# Patient Record
Sex: FEMALE | Race: BLACK OR AFRICAN AMERICAN | NOT HISPANIC OR LATINO | Employment: PART TIME | URBAN - METROPOLITAN AREA
[De-identification: names, ages, dates, MRNs, and addresses within clinical notes are randomized per-mention and may not be internally consistent; named-entity substitution may affect disease eponyms.]

---

## 2021-10-15 ENCOUNTER — TRANSITIONAL CARE UNIT VISIT (OUTPATIENT)
Dept: GERIATRICS | Facility: CLINIC | Age: 66
End: 2021-10-15
Payer: COMMERCIAL

## 2021-10-15 VITALS
DIASTOLIC BLOOD PRESSURE: 76 MMHG | WEIGHT: 225 LBS | HEIGHT: 66 IN | RESPIRATION RATE: 18 BRPM | TEMPERATURE: 97.9 F | HEART RATE: 67 BPM | SYSTOLIC BLOOD PRESSURE: 121 MMHG | OXYGEN SATURATION: 96 % | BODY MASS INDEX: 36.16 KG/M2

## 2021-10-15 DIAGNOSIS — Z98.1 S/P SPINAL FUSION: ICD-10-CM

## 2021-10-15 DIAGNOSIS — G95.29 SPINAL CORD COMPRESSION DUE TO MALIGNANT NEOPLASM METASTATIC TO SPINE (H): Primary | ICD-10-CM

## 2021-10-15 DIAGNOSIS — M62.830 BACK MUSCLE SPASM: ICD-10-CM

## 2021-10-15 DIAGNOSIS — I50.22 CHRONIC SYSTOLIC HEART FAILURE (H): ICD-10-CM

## 2021-10-15 DIAGNOSIS — I10 HYPERTENSION, UNSPECIFIED TYPE: ICD-10-CM

## 2021-10-15 DIAGNOSIS — C79.51 SPINAL CORD COMPRESSION DUE TO MALIGNANT NEOPLASM METASTATIC TO SPINE (H): Primary | ICD-10-CM

## 2021-10-15 DIAGNOSIS — Z90.13 H/O MASTECTOMY, BILATERAL: ICD-10-CM

## 2021-10-15 PROBLEM — Z86.0100 HISTORY OF COLON POLYPS: Status: ACTIVE | Noted: 2021-10-15

## 2021-10-15 PROBLEM — R68.89 LOW BODY TEMPERATURE: Status: ACTIVE | Noted: 2021-10-08

## 2021-10-15 PROBLEM — S22.070D CLOSED WEDGE COMPRESSION FRACTURE OF T9 VERTEBRA WITH ROUTINE HEALING: Status: ACTIVE | Noted: 2021-09-21

## 2021-10-15 PROBLEM — M48.54XG: Status: ACTIVE | Noted: 2021-10-08

## 2021-10-15 PROBLEM — G47.9 SLEEP DISORDER: Status: ACTIVE | Noted: 2021-10-15

## 2021-10-15 PROBLEM — I31.39 PERICARDIAL EFFUSION: Status: ACTIVE | Noted: 2020-06-02

## 2021-10-15 PROBLEM — I25.5 ISCHEMIC CARDIOMYOPATHY: Status: ACTIVE | Noted: 2017-02-20

## 2021-10-15 PROBLEM — I51.3 LEFT ATRIAL THROMBUS: Status: ACTIVE | Noted: 2020-06-02

## 2021-10-15 PROCEDURE — 99309 SBSQ NF CARE MODERATE MDM 30: CPT | Performed by: NURSE PRACTITIONER

## 2021-10-15 RX ORDER — LETROZOLE 2.5 MG/1
2.5 TABLET, FILM COATED ORAL DAILY
COMMUNITY
Start: 2021-10-15

## 2021-10-15 RX ORDER — METHOCARBAMOL 750 MG/1
750 TABLET, FILM COATED ORAL 3 TIMES DAILY
COMMUNITY
Start: 2021-10-15 | End: 2021-11-03

## 2021-10-15 RX ORDER — LIDOCAINE/PRILOCAINE 2.5 %-2.5%
CREAM (GRAM) TOPICAL PRN
COMMUNITY
Start: 2021-10-15

## 2021-10-15 RX ORDER — AMOXICILLIN 250 MG
2 CAPSULE ORAL DAILY
COMMUNITY
Start: 2021-10-15 | End: 2021-10-26

## 2021-10-15 RX ORDER — EZETIMIBE 10 MG/1
10 TABLET ORAL DAILY
COMMUNITY
Start: 2021-10-15

## 2021-10-15 RX ORDER — ACETAMINOPHEN 500 MG
1000 TABLET ORAL EVERY 6 HOURS
COMMUNITY
Start: 2021-10-15 | End: 2021-10-29

## 2021-10-15 RX ORDER — BACLOFEN 10 MG/1
10 TABLET ORAL
COMMUNITY
Start: 2021-10-15 | End: 2021-11-03

## 2021-10-15 RX ORDER — OMEGA-3 FATTY ACIDS/FISH OIL 300-1000MG
1 CAPSULE ORAL DAILY
COMMUNITY
Start: 2021-10-15 | End: 2021-10-29

## 2021-10-15 RX ORDER — DEXAMETHASONE 2 MG/1
2 TABLET ORAL
COMMUNITY
Start: 2021-10-14 | End: 2021-10-16

## 2021-10-15 RX ORDER — ASPIRIN 81 MG/1
81 TABLET, CHEWABLE ORAL
COMMUNITY
Start: 2021-10-15 | End: 2021-11-03

## 2021-10-15 RX ORDER — NITROGLYCERIN 0.4 MG/1
0.4 TABLET SUBLINGUAL EVERY 5 MIN PRN
COMMUNITY
Start: 2021-10-15

## 2021-10-15 RX ORDER — FUROSEMIDE 20 MG
20 TABLET ORAL DAILY
COMMUNITY
Start: 2021-10-15

## 2021-10-15 RX ORDER — SCOLOPAMINE TRANSDERMAL SYSTEM 1 MG/1
1 PATCH, EXTENDED RELEASE TRANSDERMAL
COMMUNITY
Start: 2021-10-15

## 2021-10-15 RX ORDER — OXYCODONE HYDROCHLORIDE 5 MG/1
5 TABLET ORAL EVERY 6 HOURS PRN
COMMUNITY
Start: 2021-10-15 | End: 2021-10-26

## 2021-10-15 RX ORDER — ROSUVASTATIN CALCIUM 40 MG/1
40 TABLET, COATED ORAL AT BEDTIME
COMMUNITY
Start: 2021-10-15

## 2021-10-15 RX ORDER — CARVEDILOL 6.25 MG/1
6.25 TABLET ORAL 2 TIMES DAILY WITH MEALS
COMMUNITY
Start: 2021-10-15

## 2021-10-15 ASSESSMENT — MIFFLIN-ST. JEOR: SCORE: 1577.34

## 2021-10-15 NOTE — LETTER
10/15/2021        RE: Calli Holt  [address] [facility] [address]        [facility]  Primary Care Provider & Clinic: Rocío Montemayor MD, MD, [address] [place] [address] / [facility] [address]; Phone: [phone]; Fax: [phone]  Chief Complaint   Patient presents with     Hospital F/U     [facility] 10/08/2021 - 10/14/2021     [place] Medical Record Number: [id number]  Place of Service Where Encounter Took Place: [facility] (TCU) [[id number]]    Calli Holt is a 66 year old (1955), admitted to the above facility from  [facility] . Hospital stay 10/8/21 through 10/14/21.    HPI:    DIAGNOSES:  1. T9 pathologic compression fracture.  2. Metastatic breast cancer to the spine with epidural spinal cord compression.    PROCEDURES:  1. Open reduction of fracture at T9.  2. Posterior segmental instrumentation from T7-T11 with [employer] Solera system.  3. Bilateral T9 costotransversectomy for extracavitary approach to the spine.  4. T8-10 laminectomy and total bilateral facetectomies for decompression of spinal cord.  5. T9 corpectomy with removal of tumor.  6. Insertion of intervertebral mechanical device from T8-T10 with Synthes XRL expandable cage.  7. Anterior interbody arthrodesis from T8-T10 with morcellized allograft.  8. Posterior arthrodesis from T7-11 with morcellized allograft.    The patient is a long-term survivor of breast cancer [id number], back pain x5 weeks, found T9 metastatic lesion with compression Fx, no other lesions and multimodal treatment was discussed with radiation oncology, plan to separate lesion, maximal cytoreduction, and stabilize spine, post op tumor was as expected.    Patient today pleasant with mild post-anesthesia mental difficulties, back incision approximated with no s/s infection, has port-a-cath R upper chest that is used for all blood access due to peripheral vascular access limitations, has  TLSO on, upright in WC, denies pain, + back muscle spasms this morning, of note has had previous double mastectomy with chemo Tx in 2012/2016, overall no distress, able to make needs known.      CODE STATUS/ADVANCE DIRECTIVES DISCUSSION: No Order - CPR/Full code   Patient's living condition: lives alone  ALLERGIES:   Allergies   Allergen Reactions     Amoxicillin Nausea, Cramps and GI Disturbance     Ampicillin Nausea, Cramps and GI Disturbance     Erythromycin Unknown     Oxycodone Other (See Comments)     Psychosis/psychotic issues - has tolerates percocet in the past without issues (12/2011)    PAST MEDICAL HISTORY: No past medical history on file.   PAST SURGICAL HISTORY:  has no past surgical history on file.  FAMILY HISTORY: family history is not on file.  SOCIAL HISTORY:  reports that she has never smoked. She does not have any smokeless tobacco history on file.    Post Discharge Medication Reconciliation Status: discharge medications reconciled and changed, per note/orders  Current Outpatient Medications   Medication Sig     acetaminophen (TYLENOL) 500 MG tablet Take 1,000 mg by mouth every 6 hours as needed for pain     aspirin (ASA) 81 MG chewable tablet Take 81 mg by mouth daily with food     baclofen (LIORESAL) 10 MG tablet Take 10 mg by mouth 3 times daily     carvedilol (COREG) 6.25 MG tablet Take 6.25 mg by mouth 2 times daily (with meals)     dexamethasone (DECADRON) 2 MG tablet Take 2 mg by mouth daily with food Ending on 10/15/21 at 23:59     ezetimibe (ZETIA) 10 MG tablet Take 10 mg by mouth daily     furosemide (LASIX) 20 MG tablet Take 20 mg by mouth daily     letrozole (FEMARA) 2.5 MG tablet Take 2.5 mg by mouth daily     lidocaine-prilocaine (EMLA) 2.5-2.5 % external cream Apply topically as needed for moderate pain Apply to port area 45 minutes prior to access     methocarbamol (ROBAXIN) 750 MG tablet Take 750 mg by mouth 3 times daily     nitroGLYcerin (NITROSTAT) 0.4 MG sublingual tablet  "Place 0.4 mg under the tongue every 5 minutes as needed for chest pain For chest pain place 1 tablet under the tongue every 5 minutes for 3 doses. If symptoms persist 5 minutes after 1st dose call 911.     omega 3 1000 MG CAPS Take 1 capsule by mouth daily     oxyCODONE (ROXICODONE) 5 MG tablet Take 5 mg by mouth every 6 hours as needed for pain     rosuvastatin (CRESTOR) 40 MG tablet Take 40 mg by mouth At Bedtime     scopolamine (TRANSDERM) 1 MG/3DAYS 72 hr patch Place 1 patch onto the skin every 72 hours     senna-docusate (SENOKOT-S/PERICOLACE) 8.6-50 MG tablet Take 2 tablets by mouth daily     No current facility-administered medications for this visit.     ROS:  4 point ROS including Respiratory, CV, GI and , other than that noted in the HPI,  is negative    Vitals:  /76   Pulse 67   Temp 97.9  F (36.6  C)   Resp 18   Ht 1.676 m (5' 6\")   Wt 102.1 kg (225 lb)   SpO2 96%   BMI 36.32 kg/m    Exam:  GENERAL APPEARANCE:  in no distress, appears healthy  ENT:  Mouth and posterior oropharynx normal, moist mucous membranes  RESP:  lungs clear to auscultation   CV:  peripheral edema mild+ in lower legs, irregular rhythm rate  ABDOMEN:  bowel sounds normal  M/S:   Gait and station abnormal transfer assist  SKIN:  Inspection of skin and subcutaneous tissue baseline  NEURO:   Examination of sensation by touch normal  PSYCH:  oriented X 3    Lab/Diagnostic data:  Recent labs in [facility] reviewed by me today.     ASSESSMENT/PLAN:  (G95.29,  C79.51) Spinal cord compression due to malignant neoplasm metastatic to spine (H)  (primary encounter diagnosis)  (Z98.1) S/P spinal fusion  (M62.830) Back muscle spasm  Comment: tolerated procedure well, no distress, spasms+  Plan:   -PT/OT eval and treat  -discontinue oxycodone (allergy) and senokot (not effective)  -start T#3 1/2 tab Q4h PRN for pain  -continue APAP, baclofen, robaxin and scopolamine scheduled  -per request; oral glycerin BID PRN for " constipation  -portAcath last flush 10/14; flush Q4w then heparinize  -incentive spirometry QID  -obtain wrist BP cuff  -restart ASA 10/16, no restart date yet for xarelto  -follow up  in 2-4 weeks  -plans to start radiation, has appt for mapping in Nov      (I50.22) Chronic systolic heart failure (H)  Comment: IRR, denies CP  Plan: continue ASA81, lasix 20mg, statin  -follow up cardiology PRN    (I10) Hypertension, unspecified type  Comment: recent SBP's 120 range  Plan: continue coreg 6.25mg BID (was previously on 12.5)  -of note, lisinopril was DC'd in hospital  -staff to check VS as scheduled    (Z90.13) H/O mastectomy, bilateral  Comment: plus chemotherapy  Plan: continue letrozole 2.5mg daily  -follow up oncology PRN        Electronically signed by: ARNULFO Blake CNP        Sincerely,        ARNULFO Blake CNP

## 2021-10-15 NOTE — PROGRESS NOTES
[facility] GERIATRIC SERVICES  Primary Care Provider & Clinic: Rocío Montemayor MD, MD, [address] [facility] [address]; Phone: [phone]; Fax: [phone]  Chief Complaint   Patient presents with     Hospital F/U     [facility] 10/08/2021 - 10/14/2021     [place] Medical Record Number: [id number]  Place of Service Where Encounter Took Place: [facility] (TCU) [[id number]]    Calli Holt is a 66 year old (1955), admitted to the above facility from  [facility] . Hospital stay 10/8/21 through 10/14/21.    HPI:    DIAGNOSES:  1. T9 pathologic compression fracture.  2. Metastatic breast cancer to the spine with epidural spinal cord compression.    PROCEDURES:  1. Open reduction of fracture at T9.  2. Posterior segmental instrumentation from T7-T11 with [employer] Solera system.  3. Bilateral T9 costotransversectomy for extracavitary approach to the spine.  4. T8-10 laminectomy and total bilateral facetectomies for decompression of spinal cord.  5. T9 corpectomy with removal of tumor.  6. Insertion of intervertebral mechanical device from T8-T10 with Synthes XRL expandable cage.  7. Anterior interbody arthrodesis from T8-T10 with morcellized allograft.  8. Posterior arthrodesis from T7-11 with morcellized allograft.    The patient is a long-term survivor of breast cancer [id number], back pain x5 weeks, found T9 metastatic lesion with compression Fx, no other lesions and multimodal treatment was discussed with radiation oncology, plan to separate lesion, maximal cytoreduction, and stabilize spine, post op tumor was as expected.    Patient today pleasant with mild post-anesthesia mental difficulties, back incision approximated with no s/s infection, has port-a-cath R upper chest that is used for all blood access due to peripheral vascular access limitations, has TLSO on, upright in WC, denies pain, + back muscle spasms this morning, of note has had previous double  mastectomy with chemo Tx in 2012/2016, overall no distress, able to make needs known.      CODE STATUS/ADVANCE DIRECTIVES DISCUSSION: No Order - CPR/Full code   Patient's living condition: lives alone  ALLERGIES:   Allergies   Allergen Reactions     Amoxicillin Nausea, Cramps and GI Disturbance     Ampicillin Nausea, Cramps and GI Disturbance     Erythromycin Unknown     Oxycodone Other (See Comments)     Psychosis/psychotic issues - has tolerates percocet in the past without issues (12/2011)    PAST MEDICAL HISTORY: No past medical history on file.   PAST SURGICAL HISTORY:  has no past surgical history on file.  FAMILY HISTORY: family history is not on file.  SOCIAL HISTORY:  reports that she has never smoked. She does not have any smokeless tobacco history on file.    Post Discharge Medication Reconciliation Status: discharge medications reconciled and changed, per note/orders  Current Outpatient Medications   Medication Sig     acetaminophen (TYLENOL) 500 MG tablet Take 1,000 mg by mouth every 6 hours as needed for pain     aspirin (ASA) 81 MG chewable tablet Take 81 mg by mouth daily with food     baclofen (LIORESAL) 10 MG tablet Take 10 mg by mouth 3 times daily     carvedilol (COREG) 6.25 MG tablet Take 6.25 mg by mouth 2 times daily (with meals)     dexamethasone (DECADRON) 2 MG tablet Take 2 mg by mouth daily with food Ending on 10/15/21 at 23:59     ezetimibe (ZETIA) 10 MG tablet Take 10 mg by mouth daily     furosemide (LASIX) 20 MG tablet Take 20 mg by mouth daily     letrozole (FEMARA) 2.5 MG tablet Take 2.5 mg by mouth daily     lidocaine-prilocaine (EMLA) 2.5-2.5 % external cream Apply topically as needed for moderate pain Apply to port area 45 minutes prior to access     methocarbamol (ROBAXIN) 750 MG tablet Take 750 mg by mouth 3 times daily     nitroGLYcerin (NITROSTAT) 0.4 MG sublingual tablet Place 0.4 mg under the tongue every 5 minutes as needed for chest pain For chest pain place 1 tablet under  "the tongue every 5 minutes for 3 doses. If symptoms persist 5 minutes after 1st dose call 911.     omega 3 1000 MG CAPS Take 1 capsule by mouth daily     oxyCODONE (ROXICODONE) 5 MG tablet Take 5 mg by mouth every 6 hours as needed for pain     rosuvastatin (CRESTOR) 40 MG tablet Take 40 mg by mouth At Bedtime     scopolamine (TRANSDERM) 1 MG/3DAYS 72 hr patch Place 1 patch onto the skin every 72 hours     senna-docusate (SENOKOT-S/PERICOLACE) 8.6-50 MG tablet Take 2 tablets by mouth daily     No current facility-administered medications for this visit.     ROS:  4 point ROS including Respiratory, CV, GI and , other than that noted in the HPI,  is negative    Vitals:  /76   Pulse 67   Temp 97.9  F (36.6  C)   Resp 18   Ht 1.676 m (5' 6\")   Wt 102.1 kg (225 lb)   SpO2 96%   BMI 36.32 kg/m    Exam:  GENERAL APPEARANCE:  in no distress, appears healthy  ENT:  Mouth and posterior oropharynx normal, moist mucous membranes  RESP:  lungs clear to auscultation   CV:  peripheral edema mild+ in lower legs, irregular rhythm rate  ABDOMEN:  bowel sounds normal  M/S:   Gait and station abnormal transfer assist  SKIN:  Inspection of skin and subcutaneous tissue baseline  NEURO:   Examination of sensation by touch normal  PSYCH:  oriented X 3    Lab/Diagnostic data:  Recent labs in [facility] reviewed by me today.     ASSESSMENT/PLAN:  (G95.29,  C79.51) Spinal cord compression due to malignant neoplasm metastatic to spine (H)  (primary encounter diagnosis)  (Z98.1) S/P spinal fusion  (M62.830) Back muscle spasm  Comment: tolerated procedure well, no distress, spasms+  Plan:   -PT/OT eval and treat  -discontinue oxycodone (allergy) and senokot (not effective)  -start T#3 1/2 tab Q4h PRN for pain  -continue APAP, baclofen, robaxin and scopolamine scheduled  -per request; oral glycerin BID PRN for constipation  -portAcath last flush 10/14; flush Q4w then heparinize  -incentive spirometry QID  -obtain wrist BP cuff  -restart " ASA 10/16, no restart date yet for xarelto  -follow up  in 2-4 weeks  -plans to start radiation, has appt for mapping in Nov      (I50.22) Chronic systolic heart failure (H)  Comment: IRR, denies CP  Plan: continue ASA81, lasix 20mg, statin  -follow up cardiology PRN    (I10) Hypertension, unspecified type  Comment: recent SBP's 120 range  Plan: continue coreg 6.25mg BID (was previously on 12.5)  -of note, lisinopril was DC'd in hospital  -staff to check VS as scheduled    (Z90.13) H/O mastectomy, bilateral  Comment: plus chemotherapy  Plan: continue letrozole 2.5mg daily  -follow up oncology PRN        Electronically signed by: ARNULFO Blake CNP

## 2021-10-18 ENCOUNTER — TRANSITIONAL CARE UNIT VISIT (OUTPATIENT)
Dept: GERIATRICS | Facility: CLINIC | Age: 66
End: 2021-10-18
Payer: COMMERCIAL

## 2021-10-18 VITALS
OXYGEN SATURATION: 99 % | BODY MASS INDEX: 35.52 KG/M2 | HEART RATE: 90 BPM | DIASTOLIC BLOOD PRESSURE: 75 MMHG | RESPIRATION RATE: 18 BRPM | HEIGHT: 66 IN | SYSTOLIC BLOOD PRESSURE: 112 MMHG | WEIGHT: 221 LBS | TEMPERATURE: 98 F

## 2021-10-18 DIAGNOSIS — M62.830 BACK MUSCLE SPASM: ICD-10-CM

## 2021-10-18 DIAGNOSIS — C79.51 SPINAL CORD COMPRESSION DUE TO MALIGNANT NEOPLASM METASTATIC TO SPINE (H): Primary | ICD-10-CM

## 2021-10-18 DIAGNOSIS — R53.83 LETHARGY: ICD-10-CM

## 2021-10-18 DIAGNOSIS — G95.29 SPINAL CORD COMPRESSION DUE TO MALIGNANT NEOPLASM METASTATIC TO SPINE (H): Primary | ICD-10-CM

## 2021-10-18 PROCEDURE — 99309 SBSQ NF CARE MODERATE MDM 30: CPT | Performed by: NURSE PRACTITIONER

## 2021-10-18 ASSESSMENT — MIFFLIN-ST. JEOR: SCORE: 1559.2

## 2021-10-18 NOTE — PROGRESS NOTES
"[facility] SERVICES  [place] Medical Record Number: [id number]  Chief Complaint   Patient presents with     RECHECK     HPI: Calli Holt is a 66 year old (1955), who is being seen today for an episodic care visit at: [facility] ([facility]) [[id number]]. Today's concern is:   1. Spinal cord compression due to malignant neoplasm metastatic to spine (H)    2. Back muscle spasm    3. Lethargy      Patient seen today for follow up, pleasant, denies pain, back spasms intermittent and strong, dislikes feeling lethargic, may be r/t muscle relaxants (2), VS WNL, back incision approximated with no discharge, no distress.    Allergies and PMH/PSH reviewed in [facility] today.    REVIEW OF SYSTEMS:  4 point ROS including Respiratory, CV, GI and , other than that noted in the HPI,  is negative    Objective:   /75   Pulse 90   Temp 98  F (36.7  C)   Resp 18   Ht 1.676 m (5' 6\")   Wt 100.2 kg (221 lb)   SpO2 99%   BMI 35.67 kg/m    Exam:  GENERAL APPEARANCE:  Alert, in no distress  ENT:  Mouth and posterior oropharynx normal, moist mucous membranes  RESP:  lungs clear to auscultation   CV:  peripheral edema soft/mild+ in lower legs  ABDOMEN:  bowel sounds normal  M/S:   Gait and station abnormal transfer assist  SKIN:  Inspection of skin and subcutaneous tissue baseline  NEURO:   Examination of sensation by touch normal  PSYCH:  oriented X 3    Labs:   Labs done in SNF are in [facility]. Please refer to them using [facility]/Care Everywhere.    Assessment/Plan:  (G95.29,  C79.51) Spinal cord compression due to malignant neoplasm metastatic to spine (H)  (primary encounter diagnosis)  (M62.830) Back muscle spasm  (R53.83) Lethargy  Comment: balancing spasms with lethargy, mild cognitive limitations  Plan:   -change baclofen to 10mg BID PRN  -continue methocarbamol  -change APAP to TID scheduled  -encouraged to use T3# tabs for pain  -if spasms are not worsening may reduce methocarbamol  -plan " to follow up later this week RE: status and plan        Electronically signed by: ARNULFO Blake CNP

## 2021-10-18 NOTE — LETTER
"    10/18/2021        RE: Calli Holt  [address] [facility] [address]        [facility]IATRIC [facility]  [place] Medical Record Number: [id number]  Chief Complaint   Patient presents with     RECHECK     HPI: Calli Holt is a 66 year old (1955), who is being seen today for an episodic care visit at: [facility] ([facility]) [[id number]]. Today's concern is:   1. Spinal cord compression due to malignant neoplasm metastatic to spine (H)    2. Back muscle spasm    3. Lethargy      Patient seen today for follow up, pleasant, denies pain, back spasms intermittent and strong, dislikes feeling lethargic, may be r/t muscle relaxants (2), VS WNL, back incision approximated with no discharge, no distress.    Allergies and PMH/PSH reviewed in [facility] today.    REVIEW OF SYSTEMS:  4 point ROS including Respiratory, CV, GI and , other than that noted in the HPI,  is negative    Objective:   /75   Pulse 90   Temp 98  F (36.7  C)   Resp 18   Ht 1.676 m (5' 6\")   Wt 100.2 kg (221 lb)   SpO2 99%   BMI 35.67 kg/m    Exam:  GENERAL APPEARANCE:  Alert, in no distress  ENT:  Mouth and posterior oropharynx normal, moist mucous membranes  RESP:  lungs clear to auscultation   CV:  peripheral edema soft/mild+ in lower legs  ABDOMEN:  bowel sounds normal  M/S:   Gait and station abnormal transfer assist  SKIN:  Inspection of skin and subcutaneous tissue baseline  NEURO:   Examination of sensation by touch normal  PSYCH:  oriented X 3    Labs:   Labs done in SNF are in [place] [facility]. Please refer to them using [facility]/Care Everywhere.    Assessment/Plan:  (G95.29,  C79.51) Spinal cord compression due to malignant neoplasm metastatic to spine (H)  (primary encounter diagnosis)  (M62.830) Back muscle spasm  (R53.83) Lethargy  Comment: balancing spasms with lethargy, mild cognitive limitations  Plan:   -change baclofen to 10mg BID PRN  -continue methocarbamol  -change APAP to TID " scheduled  -encouraged to use T3# tabs for pain  -if spasms are not worsening may reduce methocarbamol  -plan to follow up later this week RE: status and plan        Electronically signed by: ARNULFO Blake CNP        Sincerely,        ARNULFO Blake CNP

## 2021-10-26 ENCOUNTER — TRANSITIONAL CARE UNIT VISIT (OUTPATIENT)
Dept: GERIATRICS | Facility: CLINIC | Age: 66
End: 2021-10-26
Payer: COMMERCIAL

## 2021-10-26 VITALS
BODY MASS INDEX: 34.67 KG/M2 | HEIGHT: 66 IN | SYSTOLIC BLOOD PRESSURE: 114 MMHG | TEMPERATURE: 97.5 F | DIASTOLIC BLOOD PRESSURE: 69 MMHG | WEIGHT: 215.7 LBS | RESPIRATION RATE: 18 BRPM | HEART RATE: 77 BPM | OXYGEN SATURATION: 97 %

## 2021-10-26 DIAGNOSIS — Z86.73 RECENT CEREBROVASCULAR ACCIDENT (CVA): Primary | ICD-10-CM

## 2021-10-26 DIAGNOSIS — K59.00 CONSTIPATION, UNSPECIFIED CONSTIPATION TYPE: ICD-10-CM

## 2021-10-26 DIAGNOSIS — G95.29 SPINAL CORD COMPRESSION DUE TO MALIGNANT NEOPLASM METASTATIC TO SPINE (H): ICD-10-CM

## 2021-10-26 DIAGNOSIS — Z98.1 S/P SPINAL FUSION: ICD-10-CM

## 2021-10-26 DIAGNOSIS — M48.54XG: ICD-10-CM

## 2021-10-26 DIAGNOSIS — C79.51 SPINAL CORD COMPRESSION DUE TO MALIGNANT NEOPLASM METASTATIC TO SPINE (H): ICD-10-CM

## 2021-10-26 DIAGNOSIS — I25.5 ISCHEMIC CARDIOMYOPATHY: ICD-10-CM

## 2021-10-26 DIAGNOSIS — R53.81 PHYSICAL DECONDITIONING: ICD-10-CM

## 2021-10-26 DIAGNOSIS — I10 HYPERTENSION, UNSPECIFIED TYPE: ICD-10-CM

## 2021-10-26 DIAGNOSIS — I51.3 LEFT ATRIAL THROMBUS: ICD-10-CM

## 2021-10-26 DIAGNOSIS — E78.5 DYSLIPIDEMIA: ICD-10-CM

## 2021-10-26 PROBLEM — I63.9 ACUTE ISCHEMIC STROKE (H): Status: ACTIVE | Noted: 2021-10-22

## 2021-10-26 PROBLEM — R29.810 FACIAL DROOP: Status: ACTIVE | Noted: 2021-10-22

## 2021-10-26 PROBLEM — I63.9: Status: ACTIVE | Noted: 2021-10-26

## 2021-10-26 PROBLEM — R47.1 DYSARTHRIA: Status: ACTIVE | Noted: 2021-10-22

## 2021-10-26 PROCEDURE — 99310 SBSQ NF CARE HIGH MDM 45: CPT | Performed by: NURSE PRACTITIONER

## 2021-10-26 RX ORDER — MINERAL OIL 100 G/100G
1 OIL RECTAL DAILY PRN
COMMUNITY

## 2021-10-26 ASSESSMENT — MIFFLIN-ST. JEOR: SCORE: 1535.16

## 2021-10-26 NOTE — LETTER
10/26/2021        RE: Calli Holt  [address] [facility] [id number]        [facility]  PRIMARY CARE PROVIDER AND CLINIC:  Rocío Montemayor MD, MD, [address] [facility] / [facility] [address]  Chief Complaint   Patient presents with     Hospital F/U      [place] Medical Record Number:  [id number]  Place of Service where encounter took place:  [facility] (TCU) [[id number]]    Calli Holt  is a 66 year old  (1955), admitted to the above facility from  [facility] . Hospital stay 10/21/21 through 10/23/21.     HPI:    This is a 66-year-old female, with a past medical history significant for breast cancer s/p left mastectomy with metastasis to the thoracic spine, T9 pathologic fracture, left atrial thrombus, coronary artery disease, hyperlipidemia and ischemic cardiomyopathy, who was admitted to [facility] from 10/8/21 through 10/14/21 for T9 corpectomy with bilateral costotransversectomies, T7-11 posterolateral instrumented fusion on 10/8/21 by Dr. Ventura. PTA Rivaroxaban was held due procedure. Discharged to [facility] for physical rehabilitation.     Re-admitted to [facility] 10/21/21 through 10/23/21 after developing facial droop with expressive aphasia. Found to have cardioembolic infarcts with multifocal areas of small infarction in both hemispheres on MRI. Started on Aspirin and statin therapy. Neurosurgery was consulted. Started back on Rivaroxaban. Oncology recommended to continue on aromatase inhibitor therapy with letrozole and holding palbociclib until at least 1 week following her stroke. Discharged back to a TCU when medically stable.    Today, patient describes events leading up to hospitalization. Was not able to say what she was trying to say. Called 911 and then went to the hospital. Was able to talk once she got to the ED. Reports no longer having any residual symptoms from  the stroke although her voice is still hoarse. Biggest concern is her constipation. Has been an ongoing issue. States Senna and Miralax do not work. Takes 3 Glycerin capsules in the morning at home and that works perfectly. Denies pain.     CODE STATUS/ADVANCE DIRECTIVES DISCUSSION:  CPR/Full code   ALLERGIES:   Allergies   Allergen Reactions     Amoxicillin Nausea, Cramps and GI Disturbance     Ampicillin Nausea, Cramps and GI Disturbance     Erythromycin Unknown     Oxycodone Other (See Comments)     Psychosis/psychotic issues - has tolerates percocet in the past without issues (12/2011)      PAST MEDICAL HISTORY: No past medical history on file.   PAST SURGICAL HISTORY:   has no past surgical history on file.  FAMILY HISTORY: family history is not on file.  SOCIAL HISTORY:   reports that she has never smoked. She does not have any smokeless tobacco history on file.  Patient's living condition: lives alone    Post Discharge Medication Reconciliation Status: discharge medications reconciled, continue medications without change  Current Outpatient Medications   Medication Sig     acetaminophen-codeine (TYLENOL #3) 300-30 MG tablet Take 0.5 tablets by mouth every 4 hours as needed for severe pain     aspirin (ASA) 81 MG chewable tablet Take 81 mg by mouth daily with food     baclofen (LIORESAL) 10 MG tablet Take 10 mg by mouth 3 times daily      carvedilol (COREG) 6.25 MG tablet Take 6.25 mg by mouth 2 times daily (with meals)     ezetimibe (ZETIA) 10 MG tablet Take 10 mg by mouth daily     furosemide (LASIX) 20 MG tablet Take 20 mg by mouth daily     glycerin liquid daily And every day PRN     letrozole (FEMARA) 2.5 MG tablet Take 2.5 mg by mouth daily     lidocaine-prilocaine (EMLA) 2.5-2.5 % external cream Apply topically as needed for moderate pain Apply to port area 45 minutes prior to access     methocarbamol (ROBAXIN) 750 MG tablet Take 750 mg by mouth 3 times daily     mineral oil enema Place 1 enema rectally  "daily as needed for constipation     nitroGLYcerin (NITROSTAT) 0.4 MG sublingual tablet Place 0.4 mg under the tongue every 5 minutes as needed for chest pain For chest pain place 1 tablet under the tongue every 5 minutes for 3 doses. If symptoms persist 5 minutes after 1st dose call 911.     rivaroxaban ANTICOAGULANT (XARELTO) 20 MG TABS tablet Take 20 mg by mouth daily (with dinner)     rosuvastatin (CRESTOR) 40 MG tablet Take 40 mg by mouth At Bedtime     scopolamine (TRANSDERM) 1 MG/3DAYS 72 hr patch Place 1 patch onto the skin every 72 hours     No current facility-administered medications for this visit.     ROS:  4 point ROS including Respiratory, CV, GI and , other than that noted in the HPI,  is negative    Vitals:  /69   Pulse 77   Temp 97.5  F (36.4  C)   Resp 18   Ht 1.676 m (5' 6\")   Wt 97.8 kg (215 lb 11.2 oz)   SpO2 97%   BMI 34.81 kg/m    Exam:  GENERAL APPEARANCE:  Alert, in no distress  ENT:  Mouth and posterior oropharynx normal, moist mucous membranes  EYES:  EOM, conjunctivae, lids, pupils and irises normal  RESP:  respiratory effort and palpation of chest normal, lungs clear to auscultation , no respiratory distress  CV:  Palpation and auscultation of heart done , regular rate and rhythm, no murmur, rub, or gallop  ABDOMEN:  normal bowel sounds, soft, nontender, no hepatosplenomegaly or other masses  M/S:   No edema in BLE  SKIN:  Inspection of skin and subcutaneous tissue baseline, Palpation of skin and subcutaneous tissue baseline  NEURO:   Cranial nerves 2-12 are normal tested and grossly at patient's baseline  PSYCH:  affect and mood normal    Lab/Diagnostic data:  Labs done in SNF are in [facility]. Please refer to them using [facility]/Care Everywhere.    ASSESSMENT/PLAN:  Multifocal Bihemispheric Cerebral Infarctions. Likely cardioembolic with known ischemic cardiomyopathy and prior atrial thrombus. Presented with facial droop and expressive aphasia which has since resolved. " Rivaroxaban held for surgery below and re-initiated on 10/22/21. Also started on Aspirin. Takes Rosuvastatin.     Recent T9 Pathological Fracture S/P T9 Corpectomy with Bilateral Costotransversectomies and T7-11 Posterolateral Instrumented Fusion on 10/8/21 by Dr. Ventura. Follow-up with Dr. Ventura on 11/23/21 as scheduled. Repeat imaging to be performed at that time. Acetaminophen with Codeine and Methocarbamol ordered for pain. Also on Baclofen. Will discontinue scheduled Tylenol per patient request. TLSO brace to be worn when OOB or HOB > 45 degrees. Physical and Occupational Therapy ordered.    Metastatic Breast Cancer to the Spine. Follow-up with Oncology, Dr. Rubens Holbrook,as per them. Holding Ibrance for at least 1 week. Taking Letrozole. Also has a Scopolamine Patch.    Acute Blood Loss Anemia. Secondary to above. Hemoglobin 11.8 on 10/8/21 -> 9.5 on 10/22/21. Repeat CBC to ensure stability.     History of Left Atrial Thrombus on Chronic Anticoagulation. Rivaroxaban held for surgery and re-initiated as noted above.    Coronary Artery Disease with Ischemic Cardiomyopathy with EF 35-40% in June 2020, Hypertension and Hyperlipidemia. Followed by [facility]. Patient requests to discontinue Fish Oil. Monitor blood pressure daily. Daily weights. Taking Carvedilol, Ezetimbe, Furosemide and Rosuvastatin.    Constipation. Patient reports Senna-S and Miralax do not work. Reviewed Lactulose, but patient does not want to try. Will schedule Glycerin per patient request as well as Mineral Oil suppository.     Physical Deconditioning. Secondary to recent hospitalization and co-morbidities. Physical and Occupational Therapy ordered.    Orders:  Discontinue Tylenol   Discontinue Fish Oil  Change Glycerin from PRN to scheduled  Give Mineral Oil Enema x 1 now    Total time spent with patient visit at the skilled nursing facility was 35 min including patient visit and review of past records. Greater than 50% of  total time spent with counseling and coordinating care due to discussion regarding code status, review of TCU stay, review of medications, adjustments to medications as requested, review of regimen for constipation, orders written at facility and discussion with staff.     Electronically signed by:  ARNULFO Mohan CNP                      Sincerely,        ARNULFO Mohan CNP

## 2021-10-26 NOTE — PROGRESS NOTES
[facility] GERIATRIC SERVICES  PRIMARY CARE PROVIDER AND CLINIC:  Rocío Montemayor MD, MD, [address] [facility] [address]  Chief Complaint   Patient presents with     Hospital F/U      [place] Medical Record Number:  [id number]  Place of Service where encounter took place:  [facility] (TCU) [[id number]]    Calli Holt  is a 66 year old  (1955), admitted to the above facility from  [facility] . Hospital stay 10/21/21 through 10/23/21.     HPI:    This is a 66-year-old female, with a past medical history significant for breast cancer s/p left mastectomy with metastasis to the thoracic spine, T9 pathologic fracture, left atrial thrombus, coronary artery disease, hyperlipidemia and ischemic cardiomyopathy, who was admitted to [facility] from 10/8/21 through 10/14/21 for T9 corpectomy with bilateral costotransversectomies, T7-11 posterolateral instrumented fusion on 10/8/21 by Dr. Ventura. PTA Rivaroxaban was held due procedure. Discharged to [facility] for physical rehabilitation.     Re-admitted to [facility] 10/21/21 through 10/23/21 after developing facial droop with expressive aphasia. Found to have cardioembolic infarcts with multifocal areas of small infarction in both hemispheres on MRI. Started on Aspirin and statin therapy. Neurosurgery was consulted. Started back on Rivaroxaban. Oncology recommended to continue on aromatase inhibitor therapy with letrozole and holding palbociclib until at least 1 week following her stroke. Discharged back to a TCU when medically stable.    Today, patient describes events leading up to hospitalization. Was not able to say what she was trying to say. Called 911 and then went to the hospital. Was able to talk once she got to the ED. Reports no longer having any residual symptoms from the stroke although her voice is still hoarse. Biggest concern is her constipation. Has been an ongoing issue.  States Senna and Miralax do not work. Takes 3 Glycerin capsules in the morning at home and that works perfectly. Denies pain.     CODE STATUS/ADVANCE DIRECTIVES DISCUSSION:  CPR/Full code   ALLERGIES:   Allergies   Allergen Reactions     Amoxicillin Nausea, Cramps and GI Disturbance     Ampicillin Nausea, Cramps and GI Disturbance     Erythromycin Unknown     Oxycodone Other (See Comments)     Psychosis/psychotic issues - has tolerates percocet in the past without issues (12/2011)      PAST MEDICAL HISTORY: No past medical history on file.   PAST SURGICAL HISTORY:   has no past surgical history on file.  FAMILY HISTORY: family history is not on file.  SOCIAL HISTORY:   reports that she has never smoked. She does not have any smokeless tobacco history on file.  Patient's living condition: lives alone    Post Discharge Medication Reconciliation Status: discharge medications reconciled, continue medications without change  Current Outpatient Medications   Medication Sig     acetaminophen-codeine (TYLENOL #3) 300-30 MG tablet Take 0.5 tablets by mouth every 4 hours as needed for severe pain     aspirin (ASA) 81 MG chewable tablet Take 81 mg by mouth daily with food     baclofen (LIORESAL) 10 MG tablet Take 10 mg by mouth 3 times daily      carvedilol (COREG) 6.25 MG tablet Take 6.25 mg by mouth 2 times daily (with meals)     ezetimibe (ZETIA) 10 MG tablet Take 10 mg by mouth daily     furosemide (LASIX) 20 MG tablet Take 20 mg by mouth daily     glycerin liquid daily And every day PRN     letrozole (FEMARA) 2.5 MG tablet Take 2.5 mg by mouth daily     lidocaine-prilocaine (EMLA) 2.5-2.5 % external cream Apply topically as needed for moderate pain Apply to port area 45 minutes prior to access     methocarbamol (ROBAXIN) 750 MG tablet Take 750 mg by mouth 3 times daily     mineral oil enema Place 1 enema rectally daily as needed for constipation     nitroGLYcerin (NITROSTAT) 0.4 MG sublingual tablet Place 0.4 mg under the  "tongue every 5 minutes as needed for chest pain For chest pain place 1 tablet under the tongue every 5 minutes for 3 doses. If symptoms persist 5 minutes after 1st dose call 911.     rivaroxaban ANTICOAGULANT (XARELTO) 20 MG TABS tablet Take 20 mg by mouth daily (with dinner)     rosuvastatin (CRESTOR) 40 MG tablet Take 40 mg by mouth At Bedtime     scopolamine (TRANSDERM) 1 MG/3DAYS 72 hr patch Place 1 patch onto the skin every 72 hours     No current facility-administered medications for this visit.     ROS:  4 point ROS including Respiratory, CV, GI and , other than that noted in the HPI,  is negative    Vitals:  /69   Pulse 77   Temp 97.5  F (36.4  C)   Resp 18   Ht 1.676 m (5' 6\")   Wt 97.8 kg (215 lb 11.2 oz)   SpO2 97%   BMI 34.81 kg/m    Exam:  GENERAL APPEARANCE:  Alert, in no distress  ENT:  Mouth and posterior oropharynx normal, moist mucous membranes  EYES:  EOM, conjunctivae, lids, pupils and irises normal  RESP:  respiratory effort and palpation of chest normal, lungs clear to auscultation , no respiratory distress  CV:  Palpation and auscultation of heart done , regular rate and rhythm, no murmur, rub, or gallop  ABDOMEN:  normal bowel sounds, soft, nontender, no hepatosplenomegaly or other masses  M/S:   No edema in BLE  SKIN:  Inspection of skin and subcutaneous tissue baseline, Palpation of skin and subcutaneous tissue baseline  NEURO:   Cranial nerves 2-12 are normal tested and grossly at patient's baseline  PSYCH:  affect and mood normal    Lab/Diagnostic data:  Labs done in SNF are in [facility]. Please refer to them using [facility]/Care Everywhere.    ASSESSMENT/PLAN:  Multifocal Bihemispheric Cerebral Infarctions. Likely cardioembolic with known ischemic cardiomyopathy and prior atrial thrombus. Presented with facial droop and expressive aphasia which has since resolved. Rivaroxaban held for surgery below and re-initiated on 10/22/21. Also started on Aspirin. Takes Rosuvastatin. "     Recent T9 Pathological Fracture S/P T9 Corpectomy with Bilateral Costotransversectomies and T7-11 Posterolateral Instrumented Fusion on 10/8/21 by Dr. Ventura. Follow-up with Dr. Ventura on 11/23/21 as scheduled. Repeat imaging to be performed at that time. Acetaminophen with Codeine and Methocarbamol ordered for pain. Also on Baclofen. Will discontinue scheduled Tylenol per patient request. TLSO brace to be worn when OOB or HOB > 45 degrees. Physical and Occupational Therapy ordered.    Metastatic Breast Cancer to the Spine. Follow-up with Oncology, Dr. Rubens Holbrook,as per them. Holding Ibrance for at least 1 week. Taking Letrozole. Also has a Scopolamine Patch.    Acute Blood Loss Anemia. Secondary to above. Hemoglobin 11.8 on 10/8/21 -> 9.5 on 10/22/21. Repeat CBC to ensure stability.     History of Left Atrial Thrombus on Chronic Anticoagulation. Rivaroxaban held for surgery and re-initiated as noted above.    Coronary Artery Disease with Ischemic Cardiomyopathy with EF 35-40% in June 2020, Hypertension and Hyperlipidemia. Followed by [facility]. Patient requests to discontinue Fish Oil. Monitor blood pressure daily. Daily weights. Taking Carvedilol, Ezetimbe, Furosemide and Rosuvastatin.    Constipation. Patient reports Senna-S and Miralax do not work. Reviewed Lactulose, but patient does not want to try. Will schedule Glycerin per patient request as well as Mineral Oil suppository.     Physical Deconditioning. Secondary to recent hospitalization and co-morbidities. Physical and Occupational Therapy ordered.    Orders:  Discontinue Tylenol   Discontinue Fish Oil  Change Glycerin from PRN to scheduled  Give Mineral Oil Enema x 1 now    Total time spent with patient visit at the skilled nursing facility was 35 min including patient visit and review of past records. Greater than 50% of total time spent with counseling and coordinating care due to discussion regarding code status, review of TCU  stay, review of medications, adjustments to medications as requested, review of regimen for constipation, orders written at facility and discussion with staff.     Electronically signed by:  ARNULFO Mohan CNP

## 2021-10-27 ENCOUNTER — TELEPHONE (OUTPATIENT)
Dept: GERIATRICS | Facility: CLINIC | Age: 66
End: 2021-10-27

## 2021-10-27 RX ORDER — CASTOR OIL
OIL (ML) ORAL DAILY
COMMUNITY
Start: 2021-10-27

## 2021-10-27 NOTE — TELEPHONE ENCOUNTER
[facility] Nurse Triage Telephone Note    Provider: ARNULFO Mosley CNP   Facility: [facility]  Facility Type:  TCU    Caller: Dez  Call Back Number: [phone]    Allergies:    Allergies   Allergen Reactions     Amoxicillin Nausea, Cramps and GI Disturbance     Ampicillin Nausea, Cramps and GI Disturbance     Erythromycin Unknown     Oxycodone Other (See Comments)     Psychosis/psychotic issues - has tolerates percocet in the past without issues (12/2011)        Reason for call: Nurse calling to report that patient is c/o right hand and arm pain.  This pain is not new and patient wears a compression sleeve on her right hand/arm at home.  Patient refused pain meds, but is requesting to use a hand/arm sleeve.  Patient also has an order for glycerin supp daily, but the pharmacy states the solid glycerin supp is on backorder, but they do have the Fleets liquid glycerin supp available.  Of note, patient also has orders for mineral oil 30cc orally daily PRN for constipation and mineral oil 133cc rectally PRN for constipation---took yesterday.  Patient is requesting a suppository.      Verbal Order/Direction given by Provider: Ok for to use a right hand/arm compression sleeve to be worn as patient used it at home.  Ok to use the sleeve from home.  The mineral oil suppository 133cc rectally should be given daily PRN.  Change the glycerin supp to Fleets liquid glycerin suppository---1 daily PRN.      Provider giving Order:  ARNULFO Dewitt CNP    Verbal Order given to: Dez Nieves RN

## 2021-10-29 ENCOUNTER — NURSING HOME VISIT (OUTPATIENT)
Dept: GERIATRICS | Facility: CLINIC | Age: 66
End: 2021-10-29
Payer: COMMERCIAL

## 2021-10-29 DIAGNOSIS — I10 HYPERTENSION, UNSPECIFIED TYPE: ICD-10-CM

## 2021-10-29 DIAGNOSIS — R42 VERTIGO: ICD-10-CM

## 2021-10-29 DIAGNOSIS — Z86.73 RECENT CEREBROVASCULAR ACCIDENT (CVA): Primary | ICD-10-CM

## 2021-10-29 DIAGNOSIS — I25.5 ISCHEMIC CARDIOMYOPATHY: ICD-10-CM

## 2021-10-29 DIAGNOSIS — M48.54XG: ICD-10-CM

## 2021-10-30 NOTE — PROGRESS NOTES
[facility] GERIATRIC SERVICES  PRIMARY CARE PROVIDER AND CLINIC:  Rocío Montemayor MD, MD, [address] [facility] / [facility] [address]    Pt was seen by Dr Pond on 10/29/21 for an initial TCU visit at the [facility]    Calli Holt  is a 66 year old  (1955), admitted to the above facility from  [facility] . Hospital stay 10/21/21 through 10/23/21.   Hospital course was reviewed by me, is as per the hospital discharge summary and NP note.    Pt is is a 66-year-old female, PMH of breast cancer s/p left mastectomy with metastasis to the thoracic spine, T9 pathologic fracture, left atrial thrombus, coronary artery disease with  ischemic cardiomyopathy, who was initially hospitalized at [facility] from 10/8/21 through 10/14/21 for T9 corpectomy with bilateral costotransversectomies, T7-11 posterolateral instrumented fusion on 10/8/21 by Dr. Ventura. PTA Rivaroxaban was held due procedure. Discharged to [facility]CU for physical rehabilitation.     She was rehospitalized at [facility] 10/21/21 through 10/23/21 after developing facial droop with expressive aphasia. Found to have cardioembolic infarcts with multifocal areas of small infarction in both hemispheres on MRI. Started on Aspirin and statin therapy. Neurosurgery was consulted. Started back on Rivaroxaban. Oncology recommended to continue on aromatase inhibitor therapy with letrozole and holding palbociclib until at least 1 week following her stroke. No afib noted on telemetry during hospitalization    Pt reports having no significant sequela from CVA, except mild hoarseness  She denies speech difficulties, difficulty swallowing, focal weakness  Main c/o episodes of dizziness and vertigo, lasting several seconds to minutes, not related to activity, has been occurring for at least several weeks, per Pt, prior to recent CVA  She notes moderate back pain.  She notes constipation since,  particularly since her recent spine surgery  This am, Pt is seen while sitting in therapy, noted the acute onset of vertigo, lasting a few minutes, no difficulty speaking, no focal neuro symptoms during episode  BP was not taken  HR 60s, apically, irreglar      CODE STATUS/ADVANCE DIRECTIVES DISCUSSION:  CPR/Full code   ALLERGIES:   Allergies   Allergen Reactions     Amoxicillin Nausea, Cramps and GI Disturbance     Ampicillin Nausea, Cramps and GI Disturbance     Erythromycin Unknown     Oxycodone Other (See Comments)     Psychosis/psychotic issues - has tolerates percocet in the past without issues (12/2011)      PAST MEDICAL HISTORY:   As noted above; in addition:  History of LA thrombus, on AC chronically  THN  Sarcoidosis  Obesity    Echocardiogram 10/22/21  Final Impressions:    1. Severe LV enlargement, normal wall thickness, estimated EF ~ 35%.    2. Basal posterior/inferior walls and apex are abnormal.    3. Dysynchronous LV activation pattern.    4. Normal RV size and systolic function.    5. The mitral valve is sclerotic, at least moderate mitral regurgitation.    6. Estimated PASP 30 mmHg + RA pressure (which is normal by IVC geometry).    7. Trivial pericardial effusion.        PAST SURGICAL HISTORY:    Recent spine surgery  L and R mastectomy  Hysterectomy    FAMILY HISTORY:     Cancer Mother   liver/DM/HTN/Lipids     Hypertension Father   CAD/Lipids/CVA     Hypertension Sister   RA/     Good Health Brother     Good Health Brother     Stroke Paternal Grandmother     Heart Disease Paternal Grandmother     Cancer Maternal Grandfather   stomach     SOCIAL HISTORY:   reports that she has never smoked. She does not have any smokeless tobacco history on file.  Patient's living condition: lives alone          Current Outpatient Medications   Medication Sig     acetaminophen-codeine (TYLENOL #3) 300-30 MG tablet Take 0.5 tablets by mouth every 4 hours as needed for severe pain     aspirin (ASA) 81 MG chewable  tablet Take 81 mg by mouth daily with food     baclofen (LIORESAL) 10 MG tablet Take 10 mg by mouth 3 times daily      carvedilol (COREG) 6.25 MG tablet Take 6.25 mg by mouth 2 times daily (with meals)     ezetimibe (ZETIA) 10 MG tablet Take 10 mg by mouth daily     furosemide (LASIX) 20 MG tablet Take 20 mg by mouth daily     glycerin liquid daily And every day PRN     letrozole (FEMARA) 2.5 MG tablet Take 2.5 mg by mouth daily     lidocaine-prilocaine (EMLA) 2.5-2.5 % external cream Apply topically as needed for moderate pain Apply to port area 45 minutes prior to access     methocarbamol (ROBAXIN) 750 MG tablet Take 750 mg by mouth 3 times daily     mineral oil enema Place 1 enema rectally daily as needed for constipation     nitroGLYcerin (NITROSTAT) 0.4 MG sublingual tablet Place 0.4 mg under the tongue every 5 minutes as needed for chest pain For chest pain place 1 tablet under the tongue every 5 minutes for 3 doses. If symptoms persist 5 minutes after 1st dose call 911.     rivaroxaban ANTICOAGULANT (XARELTO) 20 MG TABS tablet Take 20 mg by mouth daily (with dinner)     rosuvastatin (CRESTOR) 40 MG tablet Take 40 mg by mouth At Bedtime     scopolamine (TRANSDERM) 1 MG/3DAYS 72 hr patch Place 1 patch onto the skin every 72 hours     No current facility-administered medications for this visit.     ROS:  10 point ROS including Respiratory, CV, GI and , other than that noted in the HPI,  is negative        Exam:  GENERAL APPEARANCE:  Alert, in no distress, sitting in therapy, wearing TLSO brace  Pt subsequently c/o dizziness and vertigo, appeared uncomfortable for approx 3 minutes  Skin warm, dry  Scop patch in place  ENT: oral mucosa moist  EYES:  EOM  No nystagmus noted during episode of vertigo  RESP: clear  CV: irreg, HR 60s  ABDOMEN:  soft  M/S:  No LE edema  SKIN: spine incision was not examined  NEURO:  Alert, fully oriented, pleasant  Face symmetric  Speech fluent  No tremors  PSYCH:  affect and mood  normal        ASSESSMENT/PLAN:    Multifocal Bihemispheric Cerebral Infarctions. Likely cardioembolic with known ischemic cardiomyopathy and prior atrial thrombus. Facial droop and expressive aphasia have resolved. Rivaroxaban held for surgery below and re-initiated on 10/22/21. Also started on Aspirin. Takes Rosuvastatin.   No afib noted on telemetry  Plan monitor neuro status. Continue Rivaroxaban and ASA    Dizziness, vertigo  Per Pt, has been occurring for at least several months, ie prior to CVA  Currently receiving scop patch  Etiology unclear, differential includes, vestibular or cerebellar process (note chronic appearing cerebellar infarctions noted on recent MR), hypoperfusion in setting of ischemic cardiomyopathy, arrhythmia, anxiety  Plan monitor, document vitals during subsequent episodes, judicious use of  CNS active medications    Recent T9 Pathological Fracture S/P T9 Corpectomy with Bilateral Costotransversectomies and T7-11 Posterolateral Instrumented Fusion on 10/8/21 by Dr. Ventura.   Fair pain control  Plan TLSO brace, cautious use of pain medication, muscle relaxants  Therapies.       Metastatic Breast Cancer to the Spine. Follow-up with Oncology, Dr. Rubens Holbrook    Acute Blood Loss Anemia. Secondary to surgery  Plan monitor Hgb    History of Left Atrial Thrombus on Chronic Anticoagulation. Rivaroxaban held for surgery and re-initiate    Coronary Artery Disease with Ischemic Cardiomyopathy with EF 35-40% in June 2020, Hypertension and Hyperlipidemia.   Plan continue current medications  Avoid symptomatic hypotension  Monitor BP, HR, as above  Monitor BMP    Constipation.  Benign abd exam  Plan bowel regimen          Dionicio Pond MD

## 2021-11-01 ENCOUNTER — TRANSITIONAL CARE UNIT VISIT (OUTPATIENT)
Dept: GERIATRICS | Facility: CLINIC | Age: 66
End: 2021-11-01
Payer: COMMERCIAL

## 2021-11-01 VITALS
WEIGHT: 214 LBS | SYSTOLIC BLOOD PRESSURE: 112 MMHG | HEIGHT: 66 IN | HEART RATE: 53 BPM | BODY MASS INDEX: 34.39 KG/M2 | DIASTOLIC BLOOD PRESSURE: 70 MMHG | TEMPERATURE: 97.3 F | RESPIRATION RATE: 16 BRPM | OXYGEN SATURATION: 96 %

## 2021-11-01 DIAGNOSIS — C79.51 SPINAL CORD COMPRESSION DUE TO MALIGNANT NEOPLASM METASTATIC TO SPINE (H): ICD-10-CM

## 2021-11-01 DIAGNOSIS — M48.54XG: ICD-10-CM

## 2021-11-01 DIAGNOSIS — G95.29 SPINAL CORD COMPRESSION DUE TO MALIGNANT NEOPLASM METASTATIC TO SPINE (H): ICD-10-CM

## 2021-11-01 DIAGNOSIS — S22.070D CLOSED WEDGE COMPRESSION FRACTURE OF T9 VERTEBRA WITH ROUTINE HEALING: ICD-10-CM

## 2021-11-01 DIAGNOSIS — I50.22 CHRONIC SYSTOLIC HEART FAILURE (H): ICD-10-CM

## 2021-11-01 DIAGNOSIS — I63.9 ACUTE ISCHEMIC STROKE (H): Primary | ICD-10-CM

## 2021-11-01 PROCEDURE — 99309 SBSQ NF CARE MODERATE MDM 30: CPT | Performed by: NURSE PRACTITIONER

## 2021-11-01 RX ORDER — ACETAMINOPHEN 500 MG
1000 TABLET ORAL EVERY 8 HOURS PRN
Qty: 30 TABLET | Refills: 0
Start: 2021-11-01

## 2021-11-01 ASSESSMENT — MIFFLIN-ST. JEOR: SCORE: 1527.45

## 2021-11-01 NOTE — LETTER
"    11/1/2021        RE: Calli Holt  [address] [address]        [facility]    Chief Complaint   Patient presents with     RECHECK     HPI:  Calli Holt is a 66 year old  (1955), who is being seen today for an episodic care visit at: [facility] ([facility]) [[id number]]. Today's concern is:   1. Acute ischemic stroke (H)    2. Chronic systolic heart failure (H)    3. Spinal cord compression due to malignant neoplasm metastatic to spine (H)    4. Closed wedge compression fracture of T9 vertebra with routine healing    5. Pathological compression fracture of thoracic vertebra with delayed healing      Patient seen today for follow up, at [facility] from 10/8/21 through 10/14/21 for T9 compression Fx and T7-11 fusion, discharged to [facility], then at [facility] 10/21/21 through 10/23/21 for CVA, started on ASA, statin and restarted rivaroxaban, today sitting in WC, continues therapies, only request is glycerin for bowel issues, declines having any pain, no SOB when upright at rest, no distress.       Allergies, and PMH/PSH reviewed in [facility] today.  REVIEW OF SYSTEMS:  4 point ROS including Respiratory, CV, GI and , other than that noted in the HPI,  is negative    Objective:   /70   Pulse 53   Temp 97.3  F (36.3  C)   Resp 16   Ht 1.676 m (5' 6\")   Wt 97.1 kg (214 lb)   SpO2 96%   BMI 34.54 kg/m    GENERAL APPEARANCE:  in no distress, appears healthy  ENT:  Mouth and posterior oropharynx normal, moist mucous membranes  RESP:  lungs clear to auscultation   CV:  peripheral edema 1-2+ in lower legs, rate-normal  ABDOMEN:  bowel sounds normal  M/S:   Gait and station abnormal transfer assist  SKIN:  Inspection of skin and subcutaneous tissue baseline  NEURO:   Examination of sensation by touch normal  PSYCH:  oriented X 3    Labs done in SNF are in [facility]. Please refer to them using " "EPIC/Care Everywhere.    Assessment/Plan:  (I63.9) Acute ischemic stroke (H)  (primary encounter diagnosis)  (I50.22) Chronic systolic heart failure (H)  (G95.29,  C79.51) Spinal cord compression due to malignant neoplasm metastatic to spine (H)  (S22.070D) Closed wedge compression fracture of T9 vertebra with routine healing  (M48.54XG) Pathological compression fracture of thoracic vertebra with delayed healing  Comment: pleasant female with progressive/chronic health concerns  Plan:   -PT/OT to continue working with  -plans to return home with assist  -ordering WC and hospital bed for home use (see below)  -discontinue glycerin supp, patient will retrieve supply from home  -change APAP to TID PRN, pain controlled  -plan to follow up later this week RE: status and plan        Electronically signed by: ARNULFO Blake CNP         Documentation of Face-to-Face and Certification for DME     Patient: Calli Holt   YOB: 1955  MR Number: [id number]  Today's Date: 11/1/2021    I certify that patient: Calli Holt is under my care and that I, or a nurse practitioner or physician's assistant working with me, had a face-to-face encounter that meets the physician face-to-face encounter requirements with this patient on: 11/1/2021.    This encounter with the patient was in whole, or in part, for the following medical condition, which is the primary reason for home health care:   1. Acute ischemic stroke (H)    2. Chronic systolic heart failure (H)    3. Spinal cord compression due to malignant neoplasm metastatic to spine (H)    4. Closed wedge compression fracture of T9 vertebra with routine healing    5. Pathological compression fracture of thoracic vertebra with delayed healing        I certify that, based on my findings, the following services are medically necessary DME;    MANUAL WHEELCHAIR WITH SEAT CUSHION FOR HOME USE WITH 20\"wide x 18\" deep, 18\" floor to seat " ht. standard general use cushion, standard full length arm rests, and standard leg rests.   Lifetime need    My clinical findings support the need for the above DME because:   1. The patient has a mobility limitation that significantly impairs his/her ability to participate in one or more mobility-related activities of daily living (MRADLs) and that use of a manual wheelchair will significantly improve the patient s ability to participate in MRADLs.  2. The patient s mobility limitation cannot be sufficiently resolved by the use of an appropriately fitted cane or walker.  3. The patient s home provides adequate access between rooms, maneuvering space, and surfaces for use of the manual wheelchair that is provided.  4. The patient has not expressed an unwillingness to use the manual wheelchair that is provided in the home.  5. The patient has sufficient upper extremity function and other physical and mental capabilities needed to safely self-propel or has a caregiver who is available, willing and able to provide assistance.     The patient is under my care, and I have initiated the establishment of the plan of care.  This patient will be followed by a physician who will periodically review the plan of care.  Physician/Provider to provide follow up care: Rocío Montemayor    Responsible Medicare certified [facility] Physician: Dr.Dan Pond  Electronic Physician Signature  Date: 11/1/2021          Documentation of Face-to-Face and Certification for DME     Patient: Calli Holt   YOB: 1955  MR Number: [id number]  Today's Date: 11/1/2021    I certify that patient: Calli Holt is under my care and that I, or a nurse practitioner or physician's assistant working with me, had a face-to-face encounter that meets the physician face-to-face encounter requirements with this patient on: 11/1/2021.    This encounter with the patient was in whole, or in part, for the following medical  condition, which is the primary reason for home health care:   1. Acute ischemic stroke (H)    2. Chronic systolic heart failure (H)    3. Spinal cord compression due to malignant neoplasm metastatic to spine (H)    4. Closed wedge compression fracture of T9 vertebra with routine healing    5. Pathological compression fracture of thoracic vertebra with delayed healing        I certify that, based on my findings, the following DME is medically necessary:  Hospital bed, electric, standard mattress, 1/2 side rails, lifetime need.    The patient does  require positioning of the body in ways not feasible with an ordinary bed due to a medical condition that is expected to last at least 1 month due to CVA.   The patient does  require, for the alleviation of pain, postioning of the body in ways not feasible with an ordinary bed.   The patient does  require the head of bed elevated more than 30* most of the time due to CHF, chronic pulmonary disease or aspiration.  The patient does not require traction that can only be attached to a hospital bed.  The patient does  require a bed height different than a fixed height hospital bed to permit tranfers to wheelchair or standing position.   The patient does  require frequent or immediate changes in body position due to pain/pressure.         Based on the above findings. I certify that this patient is confined to the home and needs intermittent skilled nursing care, physical therapy and/or speech therapy.  The patient is under my care, and I have initiated the establishment of the plan of care.  This patient will be followed by a physician who will periodically review the plan of care.  Physician/Provider to provide follow up care: Rocío Montemayor    Responsible Medicare certified [place] Physician: Dr.Dan Pond  Electronic Physician Signature  Date: 11/1/2021              Sincerely,        ARNULFO Blake CNP

## 2021-11-01 NOTE — LETTER
"    11/1/2021        RE: Calli Holt  [address] [address]        [facility]    Chief Complaint   Patient presents with     RECHECK     HPI:  Calli Holt is a 66 year old  (1955), who is being seen today for an episodic care visit at: [facility] ([facility]) [[id number]]. Today's concern is:   1. Acute ischemic stroke (H)    2. Chronic systolic heart failure (H)    3. Spinal cord compression due to malignant neoplasm metastatic to spine (H)    4. Closed wedge compression fracture of T9 vertebra with routine healing    5. Pathological compression fracture of thoracic vertebra with delayed healing      Patient seen today for follow up, at [facility] from 10/8/21 through 10/14/21 for T9 compression Fx and T7-11 fusion, discharged to [facility], then at [facility] 10/21/21 through 10/23/21 for CVA, started on ASA, statin and restarted rivaroxaban, today sitting in WC, continues therapies, only request is glycerin for bowel issues, declines having any pain, no SOB when upright at rest, no distress.       Allergies, and PMH/PSH reviewed in [facility] today.  REVIEW OF SYSTEMS:  4 point ROS including Respiratory, CV, GI and , other than that noted in the HPI,  is negative    Objective:   /70   Pulse 53   Temp 97.3  F (36.3  C)   Resp 16   Ht 1.676 m (5' 6\")   Wt 97.1 kg (214 lb)   SpO2 96%   BMI 34.54 kg/m    GENERAL APPEARANCE:  in no distress, appears healthy  ENT:  Mouth and posterior oropharynx normal, moist mucous membranes  RESP:  lungs clear to auscultation   CV:  peripheral edema 1-2+ in lower legs, rate-normal  ABDOMEN:  bowel sounds normal  M/S:   Gait and station abnormal transfer assist  SKIN:  Inspection of skin and subcutaneous tissue baseline  NEURO:   Examination of sensation by touch normal  PSYCH:  oriented X 3    Labs done in SNF are in [facility]. Please refer to them using " "EPIC/Care Everywhere.    Assessment/Plan:  (I63.9) Acute ischemic stroke (H)  (primary encounter diagnosis)  (I50.22) Chronic systolic heart failure (H)  (G95.29,  C79.51) Spinal cord compression due to malignant neoplasm metastatic to spine (H)  (S22.070D) Closed wedge compression fracture of T9 vertebra with routine healing  (M48.54XG) Pathological compression fracture of thoracic vertebra with delayed healing  Comment: pleasant female with progressive/chronic health concerns  Plan:   -PT/OT to continue working with  -plans to return home with assist  -ordering WC and hospital bed for home use (see below)  -discontinue glycerin supp, patient will retrieve supply from home  -change APAP to TID PRN, pain controlled  -plan to follow up later this week RE: status and plan        Electronically signed by: ARNULFO Blake CNP         Documentation of Face-to-Face and Certification for DME     Patient: Calli Holt   YOB: 1955  MR Number: [id number]  Today's Date: 11/1/2021    I certify that patient: Calli Holt is under my care and that I, or a nurse practitioner or physician's assistant working with me, had a face-to-face encounter that meets the physician face-to-face encounter requirements with this patient on: 11/1/2021.    This encounter with the patient was in whole, or in part, for the following medical condition, which is the primary reason for home health care:   1. Acute ischemic stroke (H)    2. Chronic systolic heart failure (H)    3. Spinal cord compression due to malignant neoplasm metastatic to spine (H)    4. Closed wedge compression fracture of T9 vertebra with routine healing    5. Pathological compression fracture of thoracic vertebra with delayed healing        I certify that, based on my findings, the following services are medically necessary DME;    MANUAL WHEELCHAIR WITH SEAT CUSHION FOR HOME USE WITH 20\"wide x 18\" deep, 18\" floor to seat " ht. standard general use cushion, standard full length arm rests, and standard leg rests.   Lifetime need    My clinical findings support the need for the above DME because:   1. The patient has a mobility limitation that significantly impairs his/her ability to participate in one or more mobility-related activities of daily living (MRADLs) and that use of a manual wheelchair will significantly improve the patient s ability to participate in MRADLs.  2. The patient s mobility limitation cannot be sufficiently resolved by the use of an appropriately fitted cane or walker.  3. The patient s home provides adequate access between rooms, maneuvering space, and surfaces for use of the manual wheelchair that is provided.  4. The patient has not expressed an unwillingness to use the manual wheelchair that is provided in the home.  5. The patient has sufficient upper extremity function and other physical and mental capabilities needed to safely self-propel or has a caregiver who is available, willing and able to provide assistance.     The patient is under my care, and I have initiated the establishment of the plan of care.  This patient will be followed by a physician who will periodically review the plan of care.  Physician/Provider to provide follow up care: Rocío Montemayor    Responsible Medicare certified [facility] Physician: Dr.Dan Pond  Electronic Physician Signature  Date: 11/1/2021          Documentation of Face-to-Face and Certification for DME     Patient: Calli Holt   YOB: 1955  MR Number: [id number]  Today's Date: 11/1/2021    I certify that patient: Calli Holt is under my care and that I, or a nurse practitioner or physician's assistant working with me, had a face-to-face encounter that meets the physician face-to-face encounter requirements with this patient on: 11/1/2021.    This encounter with the patient was in whole, or in part, for the following medical  condition, which is the primary reason for home health care:   1. Acute ischemic stroke (H)    2. Chronic systolic heart failure (H)    3. Spinal cord compression due to malignant neoplasm metastatic to spine (H)    4. Closed wedge compression fracture of T9 vertebra with routine healing    5. Pathological compression fracture of thoracic vertebra with delayed healing        I certify that, based on my findings, the following DME is medically necessary:  Hospital bed, electric, standard mattress, 1/2 side rails, lifetime need.      Based on the above findings. I certify that this patient is confined to the home and needs intermittent skilled nursing care, physical therapy and/or speech therapy.  The patient is under my care, and I have initiated the establishment of the plan of care.  This patient will be followed by a physician who will periodically review the plan of care.  Physician/Provider to provide follow up care: Rocío Montemayor    Responsible Medicare certified [place] Physician: Dr.Dan Pond  Electronic Physician Signature  Date: 11/1/2021              Sincerely,        ARNULFO Blake CNP

## 2021-11-01 NOTE — PROGRESS NOTES
"[facility] GERIATRIC SERVICES    Chief Complaint   Patient presents with     RECHECK     HPI:  Calli Holt is a 66 year old  (1955), who is being seen today for an episodic care visit at: [facility] ([facility]) [[id number]]. Today's concern is:   1. Acute ischemic stroke (H)    2. Chronic systolic heart failure (H)    3. Spinal cord compression due to malignant neoplasm metastatic to spine (H)    4. Closed wedge compression fracture of T9 vertebra with routine healing    5. Pathological compression fracture of thoracic vertebra with delayed healing      Patient seen today for follow up, at [facility] from 10/8/21 through 10/14/21 for T9 compression Fx and T7-11 fusion, discharged to [facility], then at [facility] 10/21/21 through 10/23/21 for CVA, started on ASA, statin and restarted rivaroxaban, today sitting in WC, continues therapies, only request is glycerin for bowel issues, declines having any pain, no SOB when upright at rest, no distress.       Allergies, and PMH/PSH reviewed in EPIC today.  REVIEW OF SYSTEMS:  4 point ROS including Respiratory, CV, GI and , other than that noted in the HPI,  is negative    Objective:   /70   Pulse 53   Temp 97.3  F (36.3  C)   Resp 16   Ht 1.676 m (5' 6\")   Wt 97.1 kg (214 lb)   SpO2 96%   BMI 34.54 kg/m    GENERAL APPEARANCE:  in no distress, appears healthy  ENT:  Mouth and posterior oropharynx normal, moist mucous membranes  RESP:  lungs clear to auscultation   CV:  peripheral edema 1-2+ in lower legs, rate-normal  ABDOMEN:  bowel sounds normal  M/S:   Gait and station abnormal transfer assist  SKIN:  Inspection of skin and subcutaneous tissue baseline  NEURO:   Examination of sensation by touch normal  PSYCH:  oriented X 3    Labs done in SNF are in [place] EPIC. Please refer to them using [place]/Care Everywhere.    Assessment/Plan:  (I63.9) Acute ischemic stroke (H)  (primary encounter " "diagnosis)  (I50.22) Chronic systolic heart failure (H)  (G95.29,  C79.51) Spinal cord compression due to malignant neoplasm metastatic to spine (H)  (S22.070D) Closed wedge compression fracture of T9 vertebra with routine healing  (M48.54XG) Pathological compression fracture of thoracic vertebra with delayed healing  Comment: pleasant female with progressive/chronic health concerns  Plan:   -PT/OT to continue working with  -plans to return home with assist  -ordering WC and hospital bed for home use (see below)  -discontinue glycerin supp, patient will retrieve supply from home  -change APAP to TID PRN, pain controlled  -plan to follow up later this week RE: status and plan        Electronically signed by: ARNULFO Blake CNP         Documentation of Face-to-Face and Certification for DME     Patient: Calli Holt   YOB: 1955  MR Number: [id number]  Today's Date: 11/1/2021    I certify that patient: Calli Holt is under my care and that I, or a nurse practitioner or physician's assistant working with me, had a face-to-face encounter that meets the physician face-to-face encounter requirements with this patient on: 11/1/2021.    This encounter with the patient was in whole, or in part, for the following medical condition, which is the primary reason for home health care:   1. Acute ischemic stroke (H)    2. Chronic systolic heart failure (H)    3. Spinal cord compression due to malignant neoplasm metastatic to spine (H)    4. Closed wedge compression fracture of T9 vertebra with routine healing    5. Pathological compression fracture of thoracic vertebra with delayed healing        I certify that, based on my findings, the following services are medically necessary DME;    MANUAL WHEELCHAIR WITH SEAT CUSHION FOR HOME USE WITH 20\"wide x 18\" deep, 18\" floor to seat ht. standard general use cushion, standard full length arm rests, and standard leg " rests.   Lifetime need    My clinical findings support the need for the above DME because:   1. The patient has a mobility limitation that significantly impairs his/her ability to participate in one or more mobility-related activities of daily living (MRADLs) and that use of a manual wheelchair will significantly improve the patient s ability to participate in MRADLs.  2. The patient s mobility limitation cannot be sufficiently resolved by the use of an appropriately fitted cane or walker.  3. The patient s home provides adequate access between rooms, maneuvering space, and surfaces for use of the manual wheelchair that is provided.  4. The patient has not expressed an unwillingness to use the manual wheelchair that is provided in the home.  5. The patient has sufficient upper extremity function and other physical and mental capabilities needed to safely self-propel or has a caregiver who is available, willing and able to provide assistance.     The patient is under my care, and I have initiated the establishment of the plan of care.  This patient will be followed by a physician who will periodically review the plan of care.  Physician/Provider to provide follow up care: Rocío Montemayor    Responsible Medicare certified [place] Physician: Dr.Dan Pond  Electronic Physician Signature  Date: 11/1/2021          Documentation of Face-to-Face and Certification for DME     Patient: Calli Holt   YOB: 1955  MR Number: [id number]  Today's Date: 11/1/2021    I certify that patient: Calli Holt is under my care and that I, or a nurse practitioner or physician's assistant working with me, had a face-to-face encounter that meets the physician face-to-face encounter requirements with this patient on: 11/1/2021.    This encounter with the patient was in whole, or in part, for the following medical condition, which is the primary reason for home health care:   1. Acute ischemic stroke (H)     2. Chronic systolic heart failure (H)    3. Spinal cord compression due to malignant neoplasm metastatic to spine (H)    4. Closed wedge compression fracture of T9 vertebra with routine healing    5. Pathological compression fracture of thoracic vertebra with delayed healing        I certify that, based on my findings, the following DME is medically necessary:  Hospital bed, electric, standard mattress, 1/2 side rails, lifetime need.    The patient does  require positioning of the body in ways not feasible with an ordinary bed due to a medical condition that is expected to last at least 1 month due to CVA.   The patient does  require, for the alleviation of pain, postioning of the body in ways not feasible with an ordinary bed.   The patient does  require the head of bed elevated more than 30* most of the time due to CHF, chronic pulmonary disease or aspiration.  The patient does not require traction that can only be attached to a hospital bed.  The patient does  require a bed height different than a fixed height hospital bed to permit tranfers to wheelchair or standing position.   The patient does  require frequent or immediate changes in body position due to pain/pressure.         Based on the above findings. I certify that this patient is confined to the home and needs intermittent skilled nursing care, physical therapy and/or speech therapy.  The patient is under my care, and I have initiated the establishment of the plan of care.  This patient will be followed by a physician who will periodically review the plan of care.  Physician/Provider to provide follow up care: Rocío Montemayor    Responsible Medicare certified [place] Physician: Dr.Dan Pond  Electronic Physician Signature  Date: 11/1/2021

## 2021-11-01 NOTE — LETTER
"    11/1/2021        RE: Calli Holt  [address] [address] [address]        [facility]    Chief Complaint   Patient presents with     RECHECK     HPI:  Calli Holt is a 66 year old  (1955), who is being seen today for an episodic care visit at: [facility] ([facility]) [[id number]]. Today's concern is:   1. Acute ischemic stroke (H)    2. Chronic systolic heart failure (H)    3. Spinal cord compression due to malignant neoplasm metastatic to spine (H)    4. Closed wedge compression fracture of T9 vertebra with routine healing    5. Pathological compression fracture of thoracic vertebra with delayed healing      Patient seen today for follow up, at [facility] from 10/8/21 through 10/14/21 for T9 compression Fx and T7-11 fusion, discharged to [facility], then at [facility] 10/21/21 through 10/23/21 for CVA, started on ASA, statin and restarted rivaroxaban, today sitting in WC, continues therapies, only request is glycerin for bowel issues, declines having any pain, no SOB when upright at rest, no distress.       Allergies, and PMH/PSH reviewed in [facility] today.  REVIEW OF SYSTEMS:  4 point ROS including Respiratory, CV, GI and , other than that noted in the HPI,  is negative    Objective:   /70   Pulse 53   Temp 97.3  F (36.3  C)   Resp 16   Ht 1.676 m (5' 6\")   Wt 97.1 kg (214 lb)   SpO2 96%   BMI 34.54 kg/m    GENERAL APPEARANCE:  in no distress, appears healthy  ENT:  Mouth and posterior oropharynx normal, moist mucous membranes  RESP:  lungs clear to auscultation   CV:  peripheral edema 1-2+ in lower legs, rate-normal  ABDOMEN:  bowel sounds normal  M/S:   Gait and station abnormal transfer assist  SKIN:  Inspection of skin and subcutaneous tissue baseline  NEURO:   Examination of sensation by touch normal  PSYCH:  oriented X 3    Labs done in SNF are in [place] [facility]. Please refer to them using " "EPIC/Care Everywhere.    Assessment/Plan:  (I63.9) Acute ischemic stroke (H)  (primary encounter diagnosis)  (I50.22) Chronic systolic heart failure (H)  (G95.29,  C79.51) Spinal cord compression due to malignant neoplasm metastatic to spine (H)  (S22.070D) Closed wedge compression fracture of T9 vertebra with routine healing  (M48.54XG) Pathological compression fracture of thoracic vertebra with delayed healing  Comment: pleasant female with progressive/chronic health concerns  Plan:   -PT/OT to continue working with  -plans to return home with assist  -ordering WC and hospital bed for home use (see below)  -discontinue glycerin supp, patient will retrieve supply from home  -change APAP to TID PRN, pain controlled  -plan to follow up later this week RE: status and plan        Electronically signed by: ARNULFO Blake CNP         Documentation of Face-to-Face and Certification for DME     Patient: Calli Holt   YOB: 1955  MR Number: [id number]  Today's Date: 11/1/2021    I certify that patient: Calli Holt is under my care and that I, or a nurse practitioner or physician's assistant working with me, had a face-to-face encounter that meets the physician face-to-face encounter requirements with this patient on: 11/1/2021.    This encounter with the patient was in whole, or in part, for the following medical condition, which is the primary reason for home health care:   1. Acute ischemic stroke (H)    2. Chronic systolic heart failure (H)    3. Spinal cord compression due to malignant neoplasm metastatic to spine (H)    4. Closed wedge compression fracture of T9 vertebra with routine healing    5. Pathological compression fracture of thoracic vertebra with delayed healing        I certify that, based on my findings, the following services are medically necessary DME;    MANUAL WHEELCHAIR WITH SEAT CUSHION FOR HOME USE WITH 20\"wide x 18\" deep, 18\" floor to seat " ht. standard general use cushion, standard full length arm rests, and standard leg rests.   Lifetime need    My clinical findings support the need for the above DME because:   1. The patient has a mobility limitation that significantly impairs his/her ability to participate in one or more mobility-related activities of daily living (MRADLs) and that use of a manual wheelchair will significantly improve the patient s ability to participate in MRADLs.  2. The patient s mobility limitation cannot be sufficiently resolved by the use of an appropriately fitted cane or walker.  3. The patient s home provides adequate access between rooms, maneuvering space, and surfaces for use of the manual wheelchair that is provided.  4. The patient has not expressed an unwillingness to use the manual wheelchair that is provided in the home.  5. The patient has sufficient upper extremity function and other physical and mental capabilities needed to safely self-propel or has a caregiver who is available, willing and able to provide assistance.     The patient is under my care, and I have initiated the establishment of the plan of care.  This patient will be followed by a physician who will periodically review the plan of care.  Physician/Provider to provide follow up care: Rocío Montemayor    Responsible Medicare certified [place] Physician: Dr.Dan Pond  Electronic Physician Signature  Date: 11/1/2021          Documentation of Face-to-Face and Certification for DME     Patient: Calli Holt   YOB: 1955  MR Number: [id number]  Today's Date: 11/1/2021    I certify that patient: Calli Holt is under my care and that I, or a nurse practitioner or physician's assistant working with me, had a face-to-face encounter that meets the physician face-to-face encounter requirements with this patient on: 11/1/2021.    This encounter with the patient was in whole, or in part, for the following medical  condition, which is the primary reason for home health care:   1. Acute ischemic stroke (H)    2. Chronic systolic heart failure (H)    3. Spinal cord compression due to malignant neoplasm metastatic to spine (H)    4. Closed wedge compression fracture of T9 vertebra with routine healing    5. Pathological compression fracture of thoracic vertebra with delayed healing        I certify that, based on my findings, the following DME is medically necessary:  Hospital bed, electric, standard mattress, 1/2 side rails, lifetime need.      Based on the above findings. I certify that this patient is confined to the home and needs intermittent skilled nursing care, physical therapy and/or speech therapy.  The patient is under my care, and I have initiated the establishment of the plan of care.  This patient will be followed by a physician who will periodically review the plan of care.  Physician/Provider to provide follow up care: Rocío Montemayor    Responsible Medicare certified [place] Physician: Dr.Dan Pond  Electronic Physician Signature  Date: 11/1/2021              Sincerely,        ARNULFO Blake CNP

## 2021-11-03 ENCOUNTER — TELEPHONE (OUTPATIENT)
Dept: GERIATRICS | Facility: CLINIC | Age: 66
End: 2021-11-03

## 2021-11-03 ENCOUNTER — NURSING HOME VISIT (OUTPATIENT)
Dept: GERIATRICS | Facility: CLINIC | Age: 66
End: 2021-11-03
Payer: COMMERCIAL

## 2021-11-03 VITALS
RESPIRATION RATE: 18 BRPM | HEIGHT: 66 IN | OXYGEN SATURATION: 97 % | SYSTOLIC BLOOD PRESSURE: 111 MMHG | WEIGHT: 216.1 LBS | TEMPERATURE: 97.7 F | HEART RATE: 102 BPM | BODY MASS INDEX: 34.73 KG/M2 | DIASTOLIC BLOOD PRESSURE: 73 MMHG

## 2021-11-03 DIAGNOSIS — G95.29 SPINAL CORD COMPRESSION DUE TO MALIGNANT NEOPLASM METASTATIC TO SPINE (H): ICD-10-CM

## 2021-11-03 DIAGNOSIS — S22.070D CLOSED WEDGE COMPRESSION FRACTURE OF T9 VERTEBRA WITH ROUTINE HEALING: ICD-10-CM

## 2021-11-03 DIAGNOSIS — I63.9 ACUTE ISCHEMIC STROKE (H): Primary | ICD-10-CM

## 2021-11-03 DIAGNOSIS — C79.51 SPINAL CORD COMPRESSION DUE TO MALIGNANT NEOPLASM METASTATIC TO SPINE (H): ICD-10-CM

## 2021-11-03 PROCEDURE — 99309 SBSQ NF CARE MODERATE MDM 30: CPT | Performed by: NURSE PRACTITIONER

## 2021-11-03 ASSESSMENT — MIFFLIN-ST. JEOR: SCORE: 1536.97

## 2021-11-03 NOTE — PROGRESS NOTES
"[facility] GERIATRIC SERVICES    Chief Complaint   Patient presents with     Nursing Home Acute     HPI:  Calli Holt is a 66 year old  (1955), who is being seen today for an episodic care visit at: [facility] ([facility]) [[id number]]. Today's concern is:   1. Acute ischemic stroke (H)    2. Closed wedge compression fracture of T9 vertebra with routine healing    3. Spinal cord compression due to malignant neoplasm metastatic to spine (H)      Patient seen today for follow up, initially planned to discharge home but pending hospital bed delivery and continued therapies will defer to early next week, post CVA with no residual effects/hemiplegia noted, appears as cognitively intact and prior to CVA, denies having any pain and reviewed medications today, does not want to take robaxin TID scheduled and has not taken T#3, already changed APAP to PRN on request, wearing TLSO brace when out of bed, able to make needs known, certain level of anxiety returning home, has a friend flying in Sunday pm to assist with getting home setup and to be there when bed is delivered.    Allergies, and PMH/PSH reviewed in EPIC today.  REVIEW OF SYSTEMS:  4 point ROS including Respiratory, CV, GI and , other than that noted in the HPI,  is negative    Objective:   /73   Pulse 102   Temp 97.7  F (36.5  C)   Resp 18   Ht 1.676 m (5' 6\")   Wt 98 kg (216 lb 1.6 oz)   SpO2 97%   BMI 34.88 kg/m    GENERAL APPEARANCE:  in no distress, appears healthy  ENT:  Mouth and posterior oropharynx normal, moist mucous membranes  RESP:  no respiratory distress  CV:  peripheral edema mild+ in lower legs  ABDOMEN:  no guarding or rebound  M/S:   Gait and station abnormal transfer assist  SKIN:  Inspection of skin and subcutaneous tissue baseline  NEURO:   Examination of sensation by touch normal  PSYCH:  oriented X 3    Labs done in SNF are in [place] EPIC. Please refer to them using EPIC/Care " Everywhere.    Assessment/Plan:  (I63.9) Acute ischemic stroke (H)  (primary encounter diagnosis)  Comment: post CVA, no complications  Plan: continue xarelto  -no longer on ASA   -follow up neurology PRN    (S22.100D) Closed wedge compression fracture of T9 vertebra with routine healing  (G95.29,  C79.51) Spinal cord compression due to malignant neoplasm metastatic to spine (H)  Comment: denies pain, muscle spasms  Plan:   -discontinue methocarbamol  -discontinue baclofen  -discontinue tylenol#3  -continue APAP TID PRN  -port-A-cath flush due week of 11/15  -follow up appt with  in 2 months for spine XR      Electronically signed by: ARNULFO Blake CNP

## 2021-11-03 NOTE — LETTER
"    11/3/2021        RE: Calli Holt  [address] [address] [address]        [facility]    Chief Complaint   Patient presents with     Nursing Home Acute     HPI:  Calli Holt is a 66 year old  (1955), who is being seen today for an episodic care visit at: [facility] ([facility]) [[id number]]. Today's concern is:   1. Acute ischemic stroke (H)    2. Closed wedge compression fracture of T9 vertebra with routine healing    3. Spinal cord compression due to malignant neoplasm metastatic to spine (H)      Patient seen today for follow up, initially planned to discharge home but pending hospital bed delivery and continued therapies will defer to early next week, post CVA with no residual effects/hemiplegia noted, appears as cognitively intact and prior to CVA, denies having any pain and reviewed medications today, does not want to take robaxin TID scheduled and has not taken T#3, already changed APAP to PRN on request, wearing TLSO brace when out of bed, able to make needs known, certain level of anxiety returning home, has a friend flying in Sunday pm to assist with getting home setup and to be there when bed is delivered.    Allergies, and PMH/PSH reviewed in EPIC today.  REVIEW OF SYSTEMS:  4 point ROS including Respiratory, CV, GI and , other than that noted in the HPI,  is negative    Objective:   /73   Pulse 102   Temp 97.7  F (36.5  C)   Resp 18   Ht 1.676 m (5' 6\")   Wt 98 kg (216 lb 1.6 oz)   SpO2 97%   BMI 34.88 kg/m    GENERAL APPEARANCE:  in no distress, appears healthy  ENT:  Mouth and posterior oropharynx normal, moist mucous membranes  RESP:  no respiratory distress  CV:  peripheral edema mild+ in lower legs  ABDOMEN:  no guarding or rebound  M/S:   Gait and station abnormal transfer assist  SKIN:  Inspection of skin and subcutaneous tissue baseline  NEURO:   Examination of sensation by touch normal  PSYCH:  oriented X " 3    Labs done in SNF are in [place] EPIC. Please refer to them using EPIC/Care Everywhere.    Assessment/Plan:  (I63.9) Acute ischemic stroke (H)  (primary encounter diagnosis)  Comment: post CVA, no complications  Plan: continue xarelto  -no longer on ASA   -follow up neurology PRN    (S22.231D) Closed wedge compression fracture of T9 vertebra with routine healing  (G95.29,  C79.51) Spinal cord compression due to malignant neoplasm metastatic to spine (H)  Comment: denies pain, muscle spasms  Plan:   -discontinue methocarbamol  -discontinue baclofen  -discontinue tylenol#3  -continue APAP TID PRN  -port-A-cath flush due week of 11/15  -follow up appt with  in 2 months for spine XR      Electronically signed by: ARNULFO Blake CNP             Sincerely,        ARNULFO Blake CNP

## 2021-11-04 VITALS
BODY MASS INDEX: 34.46 KG/M2 | HEART RATE: 89 BPM | SYSTOLIC BLOOD PRESSURE: 115 MMHG | RESPIRATION RATE: 18 BRPM | TEMPERATURE: 97.6 F | HEIGHT: 66 IN | WEIGHT: 214.4 LBS | DIASTOLIC BLOOD PRESSURE: 69 MMHG | OXYGEN SATURATION: 100 %

## 2021-11-04 ASSESSMENT — MIFFLIN-ST. JEOR: SCORE: 1529.26

## 2021-11-05 ENCOUNTER — DISCHARGE SUMMARY NURSING HOME (OUTPATIENT)
Dept: GERIATRICS | Facility: CLINIC | Age: 66
End: 2021-11-05
Payer: COMMERCIAL

## 2021-11-05 DIAGNOSIS — I50.22 CHRONIC SYSTOLIC HEART FAILURE (H): ICD-10-CM

## 2021-11-05 DIAGNOSIS — S22.070D CLOSED WEDGE COMPRESSION FRACTURE OF T9 VERTEBRA WITH ROUTINE HEALING: ICD-10-CM

## 2021-11-05 DIAGNOSIS — G95.29 SPINAL CORD COMPRESSION DUE TO MALIGNANT NEOPLASM METASTATIC TO SPINE (H): Primary | ICD-10-CM

## 2021-11-05 DIAGNOSIS — I63.9 MULTIPLE CEREBRAL INFARCTIONS (H): ICD-10-CM

## 2021-11-05 DIAGNOSIS — F41.9 ANXIETY: ICD-10-CM

## 2021-11-05 DIAGNOSIS — C79.51 SPINAL CORD COMPRESSION DUE TO MALIGNANT NEOPLASM METASTATIC TO SPINE (H): Primary | ICD-10-CM

## 2021-11-05 PROCEDURE — 99316 NF DSCHRG MGMT 30 MIN+: CPT | Performed by: NURSE PRACTITIONER

## 2021-11-05 RX ORDER — LORAZEPAM 0.5 MG/1
0.5 TABLET ORAL 4 TIMES DAILY PRN
Qty: 30 TABLET | Refills: 0
Start: 2021-11-05 | End: 2021-11-08

## 2021-11-05 NOTE — PROGRESS NOTES
[facility] GERIATRIC SERVICES DISCHARGE SUMMARY  PATIENT'S NAME: Calli Holt  YOB: 1955  MEDICAL RECORD NUMBER:  [id number]  Place of Service where encounter took place:  [facility] ([facility]) [[id number]]    PRIMARY CARE PROVIDER AND CLINIC RESPONSIBLE AFTER TRANSFER:   Rocío Montemayor MD, MD, [address] [facility] [address]    Non-FMG Provider     Transferring providers: ARNULFO Blake CNP, Dionicio Pond MD  Recent Hospitalization/ED:  [facility] stay 10/21/21 through 10/23/21  Date of SNF Admission: October 23, 2021  Date of SNF (anticipated) Discharge: November 08, 2021  Discharged to: previous independent home  Cognitive Scores: NA  Physical Function: Wheelchair dependent and Pivot transfers  DME: Wheelchair, Hospital Bed and Walker    CODE STATUS/ADVANCE DIRECTIVES DISCUSSION:  Full Code   ALLERGIES: Amoxicillin, Ampicillin, Erythromycin, and Oxycodone    NURSING FACILITY COURSE   Medication Changes/Rationale:     See notes    Summary of nursing facility stay:   (G95.29,  C79.51) Spinal cord compression due to malignant neoplasm metastatic to spine (H)  (primary encounter diagnosis)  (S22.698D) Closed wedge compression fracture of T9 vertebra with routine healing  Comment: appears to be doing well, no pain  Plan:   -discontinue oxycodone, T#3, senokot, baclofen methocarbamol (not using any)  -start glycerin supp, enema PRN  -WC, hospital bed ordered  -PT to eval and treat in home  -port-A-cath flush due week on 11/15  -f/u CECY Conte on 11/16  -radiation mapping on 11/16  -f/u Dr.Kapurch mack in ~2 months  -TLSO when upright    (I63.9) Multiple cerebral infarctions (H)  Comment: xarelto held for back surgery, CVA 10/21, sent to ER  Plan: no residual effects, restarted xarelto  -follow up neurology PRN    (I50.22) Chronic systolic heart failure (H)  Comment: intermittent high/low HR, partially anxiety driven  Plan: continue current med  "regimen  -follow up cardiology PRN    (F41.9) Anxiety  Comment: high anxiety over moving home and having support, causing distress and not sleeping well  Plan: start lorazepam 0.5mg QID, ordering 1 out of [employer] and #29 to be delivered tonight        Discharge Medications:    Current Outpatient Medications   Medication Sig Dispense Refill     LORazepam (ATIVAN) 0.5 MG tablet Take 1 tablet (0.5 mg) by mouth 4 times daily as needed for anxiety 30 tablet 0     acetaminophen (TYLENOL) 500 MG tablet Take 2 tablets (1,000 mg) by mouth every 8 hours as needed for mild pain 30 tablet 0     carvedilol (COREG) 6.25 MG tablet Take 6.25 mg by mouth 2 times daily (with meals)       ezetimibe (ZETIA) 10 MG tablet Take 10 mg by mouth daily       furosemide (LASIX) 20 MG tablet Take 20 mg by mouth daily       glycerin liquid daily And every day PRN       letrozole (FEMARA) 2.5 MG tablet Take 2.5 mg by mouth daily       lidocaine-prilocaine (EMLA) 2.5-2.5 % external cream Apply topically as needed for moderate pain Apply to port area 45 minutes prior to access       mineral oil enema Place 1 enema rectally daily as needed for constipation       nitroGLYcerin (NITROSTAT) 0.4 MG sublingual tablet Place 0.4 mg under the tongue every 5 minutes as needed for chest pain For chest pain place 1 tablet under the tongue every 5 minutes for 3 doses. If symptoms persist 5 minutes after 1st dose call 911.       rivaroxaban ANTICOAGULANT (XARELTO) 20 MG TABS tablet Take 20 mg by mouth daily (with dinner)       rosuvastatin (CRESTOR) 40 MG tablet Take 40 mg by mouth At Bedtime       scopolamine (TRANSDERM) 1 MG/3DAYS 72 hr patch Place 1 patch onto the skin every 72 hours          Controlled medications:   OK home with leftover lorazepam     Past Medical History: No past medical history on file.  Physical Exam:   Vitals: /69   Pulse 89   Temp 97.6  F (36.4  C)   Resp 18   Ht 1.676 m (5' 6\")   Wt 97.3 kg (214 lb 6.4 oz)   SpO2 100%   BMI " 34.61 kg/m    BMI= Body mass index is 34.61 kg/m .  GENERAL APPEARANCE:  in no distress, appears healthy  ENT:  Mouth and posterior oropharynx normal, moist mucous membranes  RESP:  lungs clear to auscultation   CV:  peripheral edema moderate+ in lower legs  ABDOMEN:  bowel sounds normal  M/S:   Gait and station abnormal transfer assist  SKIN:  Inspection of skin and subcutaneous tissue baseline  NEURO:   Examination of sensation by touch normal  PSYCH:  oriented X 3     SNF labs: Labs done in SNF are in [place] EPIC. Please refer to them using EPIC/Care Everywhere.    DISCHARGE PLAN:    Follow up labs: No labs orders/due    Medical Follow Up:      Follow up with primary care provider in 1 weeks    Current [place] scheduled appointments:   NA    Discharge Services: Home Care:  Occupational Therapy, Physical Therapy, Registered Nurse and Home Health Aide    Discharge Instructions Verbalized to Patient at Discharge:     None    TOTAL DISCHARGE TIME:   Greater than 30 minutes  Electronically signed by:  ARNULFO Blake CNP         Documentation of Face-to-Face and Certification for Home Health Services     Patient: Calli Holt   YOB: 1955  MR Number: [id number]  Today's Date: 11/5/2021    I certify that patient: Calli Holt is under my care and that I, or a nurse practitioner or physician's assistant working with me, had a face-to-face encounter that meets the physician face-to-face encounter requirements with this patient on: 11/5/2021.    This encounter with the patient was in whole, or in part, for the following medical condition, which is the primary reason for home health care:   1. Spinal cord compression due to malignant neoplasm metastatic to spine (H)    2. Closed wedge compression fracture of T9 vertebra with routine healing    3. Multiple cerebral infarctions (H)    4. Chronic systolic heart failure (H)    5. Anxiety        I certify that, based on  my findings, the following services are medically necessary home health services: Nursing, Occupational Therapy, Physical Therapy and HHA.    My clinical findings support the need for the above services because: Nurse is needed: To provide caregiver training to assist with: medication setup, s/s CHF, VS.., Occupational Therapy Services are needed to assess and treat cognitive ability and address ADL safety due to impairment in DME eval, transfers., Physical Therapy Services are needed to assess and treat the following functional impairments: conditioning, falls. and HHA for bathing safety    Further, I certify that my clinical findings support that this patient is homebound (i.e. absences from home require considerable and taxing effort and are for medical reasons or [employer] services or infrequently or of short duration when for other reasons) because: Requires assistance of another person or specialized equipment to access medical services because patient: Is unable to operate assistive equipment on their own...    Based on the above findings. I certify that this patient is confined to the home and needs intermittent skilled nursing care, physical therapy and/or speech therapy.  The patient is under my care, and I have initiated the establishment of the plan of care.  This patient will be followed by a physician who will periodically review the plan of care.  Physician/Provider to provide follow up care: Rocío Montemayor    Responsible Medicare certified [place] Physician: Dr.Dan Pond  Electronic Physician Signature  Date: 11/5/2021

## 2021-11-05 NOTE — LETTER
11/5/2021        RE: Calli Holt  [address] [address] [facility] [address]        [facility] DISCHARGE SUMMARY  PATIENT'S NAME: Calli Holt  YOB: 1955  MEDICAL RECORD NUMBER:  [id number]  Place of Service where encounter took place:  [facility] [[id number]]    PRIMARY CARE PROVIDER AND CLINIC RESPONSIBLE AFTER TRANSFER:   Rocío Montemayor MD, MD, [address] [facility] / [facility] [address]    Non-FMG Provider     Transferring providers: ARNULFO Blake CNP, Dionicio Pond MD  Recent Hospitalization/ED:  [facility] stay 10/21/21 through 10/23/21  Date of SNF Admission: October 23, 2021  Date of SNF (anticipated) Discharge: November 08, 2021  Discharged to: previous independent home  Cognitive Scores: NA  Physical Function: Wheelchair dependent and Pivot transfers  DME: Wheelchair, Hospital Bed and Walker    CODE STATUS/ADVANCE DIRECTIVES DISCUSSION:  Full Code   ALLERGIES: Amoxicillin, Ampicillin, Erythromycin, and Oxycodone    NURSING FACILITY COURSE   Medication Changes/Rationale:     See notes    Summary of nursing facility stay:   (G95.29,  C79.51) Spinal cord compression due to malignant neoplasm metastatic to spine (H)  (primary encounter diagnosis)  (S22.058D) Closed wedge compression fracture of T9 vertebra with routine healing  Comment: appears to be doing well, no pain  Plan:   -discontinue oxycodone, T#3, senokot, baclofen methocarbamol (not using any)  -start glycerin supp, enema PRN  -WC, hospital bed ordered  -PT to eval and treat in home  -port-A-cath flush due week on 11/15  -f/u PCP [place] on 11/16  -radiation mapping on 11/16  -f/u Dr.Kapurch mack in ~2 months  -TLSO when upright    (I63.9) Multiple cerebral infarctions (H)  Comment: xarelto held for back surgery, CVA 10/21, sent to ER  Plan: no residual effects, restarted xarelto  -follow up neurology PRN    (I50.22) Chronic systolic heart  failure (H)  Comment: intermittent high/low HR, partially anxiety driven  Plan: continue current med regimen  -follow up cardiology PRN    (F41.9) Anxiety  Comment: high anxiety over moving home and having support, causing distress and not sleeping well  Plan: start lorazepam 0.5mg QID, ordering 1 out of [employer] and #29 to be delivered tonight        Discharge Medications:    Current Outpatient Medications   Medication Sig Dispense Refill     LORazepam (ATIVAN) 0.5 MG tablet Take 1 tablet (0.5 mg) by mouth 4 times daily as needed for anxiety 30 tablet 0     acetaminophen (TYLENOL) 500 MG tablet Take 2 tablets (1,000 mg) by mouth every 8 hours as needed for mild pain 30 tablet 0     carvedilol (COREG) 6.25 MG tablet Take 6.25 mg by mouth 2 times daily (with meals)       ezetimibe (ZETIA) 10 MG tablet Take 10 mg by mouth daily       furosemide (LASIX) 20 MG tablet Take 20 mg by mouth daily       glycerin liquid daily And every day PRN       letrozole (FEMARA) 2.5 MG tablet Take 2.5 mg by mouth daily       lidocaine-prilocaine (EMLA) 2.5-2.5 % external cream Apply topically as needed for moderate pain Apply to port area 45 minutes prior to access       mineral oil enema Place 1 enema rectally daily as needed for constipation       nitroGLYcerin (NITROSTAT) 0.4 MG sublingual tablet Place 0.4 mg under the tongue every 5 minutes as needed for chest pain For chest pain place 1 tablet under the tongue every 5 minutes for 3 doses. If symptoms persist 5 minutes after 1st dose call 911.       rivaroxaban ANTICOAGULANT (XARELTO) 20 MG TABS tablet Take 20 mg by mouth daily (with dinner)       rosuvastatin (CRESTOR) 40 MG tablet Take 40 mg by mouth At Bedtime       scopolamine (TRANSDERM) 1 MG/3DAYS 72 hr patch Place 1 patch onto the skin every 72 hours          Controlled medications:   OK home with leftover lorazepam     Past Medical History: No past medical history on file.  Physical Exam:   Vitals: /69   Pulse 89   Temp  "97.6  F (36.4  C)   Resp 18   Ht 1.676 m (5' 6\")   Wt 97.3 kg (214 lb 6.4 oz)   SpO2 100%   BMI 34.61 kg/m    BMI= Body mass index is 34.61 kg/m .  GENERAL APPEARANCE:  in no distress, appears healthy  ENT:  Mouth and posterior oropharynx normal, moist mucous membranes  RESP:  lungs clear to auscultation   CV:  peripheral edema moderate+ in lower legs  ABDOMEN:  bowel sounds normal  M/S:   Gait and station abnormal transfer assist  SKIN:  Inspection of skin and subcutaneous tissue baseline  NEURO:   Examination of sensation by touch normal  PSYCH:  oriented X 3     SNF labs: Labs done in SNF are in [place] EPIC. Please refer to them using EPIC/Care Everywhere.    DISCHARGE PLAN:    Follow up labs: No labs orders/due    Medical Follow Up:      Follow up with primary care provider in 1 weeks    Current [place] scheduled appointments:   NA    Discharge Services: Home Care:  Occupational Therapy, Physical Therapy, Registered Nurse and Home Health Aide    Discharge Instructions Verbalized to Patient at Discharge:     None    TOTAL DISCHARGE TIME:   Greater than 30 minutes  Electronically signed by:  ARNULFO Blake CNP         Documentation of Face-to-Face and Certification for Home Health Services     Patient: Calli Holt   YOB: 1955  MR Number: [id number]  Today's Date: 11/5/2021    I certify that patient: Calli Holt is under my care and that I, or a nurse practitioner or physician's assistant working with me, had a face-to-face encounter that meets the physician face-to-face encounter requirements with this patient on: 11/5/2021.    This encounter with the patient was in whole, or in part, for the following medical condition, which is the primary reason for home health care:   1. Spinal cord compression due to malignant neoplasm metastatic to spine (H)    2. Closed wedge compression fracture of T9 vertebra with routine healing    3. Multiple cerebral " infarctions (H)    4. Chronic systolic heart failure (H)    5. Anxiety        I certify that, based on my findings, the following services are medically necessary home health services: Nursing, Occupational Therapy, Physical Therapy and HHA.    My clinical findings support the need for the above services because: Nurse is needed: To provide caregiver training to assist with: medication setup, s/s CHF, VS.., Occupational Therapy Services are needed to assess and treat cognitive ability and address ADL safety due to impairment in DME eval, transfers., Physical Therapy Services are needed to assess and treat the following functional impairments: conditioning, falls. and HHA for bathing safety    Further, I certify that my clinical findings support that this patient is homebound (i.e. absences from home require considerable and taxing effort and are for medical reasons or [employer] services or infrequently or of short duration when for other reasons) because: Requires assistance of another person or specialized equipment to access medical services because patient: Is unable to operate assistive equipment on their own...    Based on the above findings. I certify that this patient is confined to the home and needs intermittent skilled nursing care, physical therapy and/or speech therapy.  The patient is under my care, and I have initiated the establishment of the plan of care.  This patient will be followed by a physician who will periodically review the plan of care.  Physician/Provider to provide follow up care: Rocío Montemayor    Responsible Medicare certified [place] Physician: Dr.Dan Pond  Electronic Physician Signature  Date: 11/5/2021                    Sincerely,        ARNULFO Blake CNP

## 2021-11-08 ENCOUNTER — TRANSITIONAL CARE UNIT VISIT (OUTPATIENT)
Dept: GERIATRICS | Facility: CLINIC | Age: 66
End: 2021-11-08
Payer: COMMERCIAL

## 2021-11-08 ENCOUNTER — TELEPHONE (OUTPATIENT)
Dept: GERIATRICS | Facility: CLINIC | Age: 66
End: 2021-11-08

## 2021-11-08 DIAGNOSIS — F41.9 ANXIETY: ICD-10-CM

## 2021-11-08 DIAGNOSIS — I63.9 ACUTE ISCHEMIC STROKE (H): Primary | ICD-10-CM

## 2021-11-08 DIAGNOSIS — S22.070D CLOSED WEDGE COMPRESSION FRACTURE OF T9 VERTEBRA WITH ROUTINE HEALING: ICD-10-CM

## 2021-11-08 PROCEDURE — 99309 SBSQ NF CARE MODERATE MDM 30: CPT | Performed by: NURSE PRACTITIONER

## 2021-11-08 RX ORDER — LORAZEPAM 0.5 MG/1
0.5 TABLET ORAL 4 TIMES DAILY PRN
Qty: 30 TABLET | Refills: 0 | Status: SHIPPED | OUTPATIENT
Start: 2021-11-08

## 2021-11-08 ASSESSMENT — MIFFLIN-ST. JEOR: SCORE: 1069.31

## 2021-11-08 NOTE — LETTER
"    11/8/2021        RE: Calli Holt  [address] [facility] [address]        [facility]    Chief Complaint   Patient presents with     Nursing Home Acute     HPI:  Calli Holt is a 66 year old  (1955), who is being seen today for an episodic care visit at: [facility] ([facility]) [[id number]]. Today's concern is:   1. Acute ischemic stroke (H)    2. Closed wedge compression fracture of T9 vertebra with routine healing    3. Anxiety      Patient seen today for follow up, plans to leave for home this am, has friends/family to support short term, med list reviewed as appropriate, started lorazepam PRN and per patient anxiety now much better control, denies back pain, no repeat of s/s CVA, no distress.    Allergies, and PMH/PSH reviewed in EPIC today.  REVIEW OF SYSTEMS:  4 point ROS including Respiratory, CV, GI and , other than that noted in the HPI,  is negative    Objective:   /73   Pulse 109   Temp 97.2  F (36.2  C)   Resp 18   Ht 1.676 m (5' 6\")   Wt 51.3 kg (113 lb)   SpO2 98%   BMI 18.24 kg/m    GENERAL APPEARANCE:  in no distress, appears healthy  ENT:  Mouth and posterior oropharynx normal, moist mucous membranes  RESP:  lungs clear to auscultation   CV:  peripheral edema mild+ in lower legs  ABDOMEN:  bowel sounds normal  M/S:   Gait and station abnormal transfer assist  SKIN:  Inspection of skin and subcutaneous tissue baseline  NEURO:   Examination of sensation by touch normal  PSYCH:  oriented X 3    Labs done in SNF [facility]. Please refer to them using [facility]/Care Everywhere.    Assessment/Plan:  (I63.9) Acute ischemic stroke (H)  (primary encounter diagnosis)  (S22.070D) Closed wedge compression fracture of T9 vertebra with routine healing  (F41.9) Anxiety  Comment: discharging today, mild anxiety, denies pain  Plan:   -continue therapies in home  -changed lorazepam to PRN, refilled #30 at [facility] in [place] today " as facility did not send current supply with patient  -radiation mapping appt on 11/12  -follow up PCP on 11/16, of note port flush due soon also  - neuro appt in 1-2 months        Electronically signed by: ARNULFO Blake CNP             Sincerely,        ARNULFO Blake CNP

## 2021-11-08 NOTE — PROGRESS NOTES
"[facility] GERIATRIC SERVICES    Chief Complaint   Patient presents with     Nursing Home Acute     HPI:  Calli Holt is a 66 year old  (1955), who is being seen today for an episodic care visit at: [facility] ([facility]) [[id number]]. Today's concern is:   1. Acute ischemic stroke (H)    2. Closed wedge compression fracture of T9 vertebra with routine healing    3. Anxiety      Patient seen today for follow up, plans to leave for home this am, has friends/family to support short term, med list reviewed as appropriate, started lorazepam PRN and per patient anxiety now much better control, denies back pain, no repeat of s/s CVA, no distress.    Allergies, and PMH/PSH reviewed in EPIC today.  REVIEW OF SYSTEMS:  4 point ROS including Respiratory, CV, GI and , other than that noted in the HPI,  is negative    Objective:   /73   Pulse 109   Temp 97.2  F (36.2  C)   Resp 18   Ht 1.676 m (5' 6\")   Wt 51.3 kg (113 lb)   SpO2 98%   BMI 18.24 kg/m    GENERAL APPEARANCE:  in no distress, appears healthy  ENT:  Mouth and posterior oropharynx normal, moist mucous membranes  RESP:  lungs clear to auscultation   CV:  peripheral edema mild+ in lower legs  ABDOMEN:  bowel sounds normal  M/S:   Gait and station abnormal transfer assist  SKIN:  Inspection of skin and subcutaneous tissue baseline  NEURO:   Examination of sensation by touch normal  PSYCH:  oriented X 3    Labs done in SNF are in [facility]. Please refer to them using [facility]/Care Everywhere.    Assessment/Plan:  (I63.9) Acute ischemic stroke (H)  (primary encounter diagnosis)  (S22.070D) Closed wedge compression fracture of T9 vertebra with routine healing  (F41.9) Anxiety  Comment: discharging today, mild anxiety, denies pain  Plan:   -continue therapies in home  -changed lorazepam to PRN, refilled #30 at [facility] in [place] today as facility did not send current supply with patient  -radiation mapping appt on 11/12  -follow up PCP on " 11/16, of note port flush due soon also  - neuro appt in 1-2 months        Electronically signed by: ARNULFO Blake CNP

## 2023-05-31 NOTE — CONFIDENTIAL NOTE
Nurse called concerned about patient HR- apical pulse is 92, recheck 30 minutes later was 99. She is asymptomatic. BP at last check was 101/60. She received coreg 6.25 mg this evening.     Per chart review patient had recent heart monitor and no afib was detected. She is on rivaroxaban and aspirin, as well as Coreg    Asked nurse to monitor VS again prior to patient going to bed, or if patient develops symptoms. If HR>110 or patient is symptomatic she should call back. Follow up with primary provider tomorrow  ARNULFO Molina CNP on 11/3/2021 at 8:40 PM    
No